# Patient Record
Sex: MALE | Race: WHITE | URBAN - METROPOLITAN AREA
[De-identification: names, ages, dates, MRNs, and addresses within clinical notes are randomized per-mention and may not be internally consistent; named-entity substitution may affect disease eponyms.]

---

## 2021-02-02 ENCOUNTER — APPOINTMENT (OUTPATIENT)
Dept: GENERAL RADIOLOGY | Age: 86
DRG: 521 | End: 2021-02-02
Attending: EMERGENCY MEDICINE
Payer: MEDICARE

## 2021-02-02 ENCOUNTER — APPOINTMENT (OUTPATIENT)
Dept: CT IMAGING | Age: 86
DRG: 521 | End: 2021-02-02
Attending: FAMILY MEDICINE
Payer: MEDICARE

## 2021-02-02 ENCOUNTER — HOSPITAL ENCOUNTER (INPATIENT)
Age: 86
LOS: 3 days | Discharge: NURSING, REHAB OR LONG-TERM CARE | DRG: 521 | End: 2021-02-05
Attending: EMERGENCY MEDICINE | Admitting: FAMILY MEDICINE
Payer: MEDICARE

## 2021-02-02 DIAGNOSIS — J18.9 PNEUMONIA DUE TO INFECTIOUS ORGANISM, UNSPECIFIED LATERALITY, UNSPECIFIED PART OF LUNG: ICD-10-CM

## 2021-02-02 DIAGNOSIS — S72.001A CLOSED FRACTURE OF RIGHT HIP, INITIAL ENCOUNTER (HCC): Primary | ICD-10-CM

## 2021-02-02 PROBLEM — S72.009A HIP FRACTURE (HCC): Status: ACTIVE | Noted: 2021-02-02

## 2021-02-02 LAB
ABO + RH BLD: NORMAL
ALBUMIN SERPL-MCNC: 1.8 G/DL (ref 3.5–5)
ALBUMIN/GLOB SERPL: 0.4 {RATIO} (ref 1.1–2.2)
ALP SERPL-CCNC: 100 U/L (ref 45–117)
ALT SERPL-CCNC: 25 U/L (ref 12–78)
ANION GAP SERPL CALC-SCNC: 3 MMOL/L (ref 5–15)
APTT PPP: 24.5 SEC (ref 22.1–31)
AST SERPL-CCNC: 53 U/L (ref 15–37)
ATRIAL RATE: 91 BPM
BASOPHILS # BLD: 0.1 K/UL (ref 0–0.1)
BASOPHILS NFR BLD: 0 % (ref 0–1)
BILIRUB SERPL-MCNC: 0.6 MG/DL (ref 0.2–1)
BLOOD GROUP ANTIBODIES SERPL: NORMAL
BUN SERPL-MCNC: 27 MG/DL (ref 6–20)
BUN/CREAT SERPL: 43 (ref 12–20)
CALCIUM SERPL-MCNC: 8.6 MG/DL (ref 8.5–10.1)
CALCULATED P AXIS, ECG09: 76 DEGREES
CALCULATED R AXIS, ECG10: 29 DEGREES
CALCULATED T AXIS, ECG11: 24 DEGREES
CHLORIDE SERPL-SCNC: 107 MMOL/L (ref 97–108)
CO2 SERPL-SCNC: 28 MMOL/L (ref 21–32)
COMMENT, HOLDF: NORMAL
CREAT SERPL-MCNC: 0.63 MG/DL (ref 0.7–1.3)
DIAGNOSIS, 93000: NORMAL
DIFFERENTIAL METHOD BLD: ABNORMAL
EOSINOPHIL # BLD: 0 K/UL (ref 0–0.4)
EOSINOPHIL NFR BLD: 0 % (ref 0–7)
ERYTHROCYTE [DISTWIDTH] IN BLOOD BY AUTOMATED COUNT: 16.7 % (ref 11.5–14.5)
GLOBULIN SER CALC-MCNC: 4.6 G/DL (ref 2–4)
GLUCOSE SERPL-MCNC: 90 MG/DL (ref 65–100)
HCT VFR BLD AUTO: 43.6 % (ref 36.6–50.3)
HGB BLD-MCNC: 14 G/DL (ref 12.1–17)
IMM GRANULOCYTES # BLD AUTO: 0.1 K/UL (ref 0–0.04)
IMM GRANULOCYTES NFR BLD AUTO: 0 % (ref 0–0.5)
INR PPP: 1 (ref 0.9–1.1)
LACTATE SERPL-SCNC: 1.3 MMOL/L (ref 0.4–2)
LYMPHOCYTES # BLD: 1.1 K/UL (ref 0.8–3.5)
LYMPHOCYTES NFR BLD: 8 % (ref 12–49)
MCH RBC QN AUTO: 27.2 PG (ref 26–34)
MCHC RBC AUTO-ENTMCNC: 32.1 G/DL (ref 30–36.5)
MCV RBC AUTO: 84.8 FL (ref 80–99)
MONOCYTES # BLD: 1.6 K/UL (ref 0–1)
MONOCYTES NFR BLD: 11 % (ref 5–13)
NEUTS SEG # BLD: 11.4 K/UL (ref 1.8–8)
NEUTS SEG NFR BLD: 81 % (ref 32–75)
NRBC # BLD: 0 K/UL (ref 0–0.01)
NRBC BLD-RTO: 0 PER 100 WBC
P-R INTERVAL, ECG05: 216 MS
PLATELET # BLD AUTO: 262 K/UL (ref 150–400)
PMV BLD AUTO: 10.1 FL (ref 8.9–12.9)
POTASSIUM SERPL-SCNC: 4.2 MMOL/L (ref 3.5–5.1)
PROT SERPL-MCNC: 6.4 G/DL (ref 6.4–8.2)
PROTHROMBIN TIME: 10.9 SEC (ref 9–11.1)
Q-T INTERVAL, ECG07: 380 MS
QRS DURATION, ECG06: 98 MS
QTC CALCULATION (BEZET), ECG08: 467 MS
RBC # BLD AUTO: 5.14 M/UL (ref 4.1–5.7)
SAMPLES BEING HELD,HOLD: NORMAL
SARS-COV-2, COV2: NORMAL
SODIUM SERPL-SCNC: 138 MMOL/L (ref 136–145)
SPECIMEN EXP DATE BLD: NORMAL
THERAPEUTIC RANGE,PTTT: NORMAL SECS (ref 58–77)
VENTRICULAR RATE, ECG03: 91 BPM
WBC # BLD AUTO: 14.2 K/UL (ref 4.1–11.1)

## 2021-02-02 PROCEDURE — 93005 ELECTROCARDIOGRAM TRACING: CPT

## 2021-02-02 PROCEDURE — 36415 COLL VENOUS BLD VENIPUNCTURE: CPT

## 2021-02-02 PROCEDURE — 71250 CT THORAX DX C-: CPT

## 2021-02-02 PROCEDURE — 87040 BLOOD CULTURE FOR BACTERIA: CPT

## 2021-02-02 PROCEDURE — 74011250636 HC RX REV CODE- 250/636: Performed by: EMERGENCY MEDICINE

## 2021-02-02 PROCEDURE — 85610 PROTHROMBIN TIME: CPT

## 2021-02-02 PROCEDURE — 73502 X-RAY EXAM HIP UNI 2-3 VIEWS: CPT

## 2021-02-02 PROCEDURE — 70450 CT HEAD/BRAIN W/O DYE: CPT

## 2021-02-02 PROCEDURE — 71045 X-RAY EXAM CHEST 1 VIEW: CPT

## 2021-02-02 PROCEDURE — 80053 COMPREHEN METABOLIC PANEL: CPT

## 2021-02-02 PROCEDURE — U0005 INFEC AGEN DETEC AMPLI PROBE: HCPCS

## 2021-02-02 PROCEDURE — 83605 ASSAY OF LACTIC ACID: CPT

## 2021-02-02 PROCEDURE — 65270000029 HC RM PRIVATE

## 2021-02-02 PROCEDURE — 74011000258 HC RX REV CODE- 258: Performed by: EMERGENCY MEDICINE

## 2021-02-02 PROCEDURE — 86901 BLOOD TYPING SEROLOGIC RH(D): CPT

## 2021-02-02 PROCEDURE — 74011250636 HC RX REV CODE- 250/636: Performed by: NURSE PRACTITIONER

## 2021-02-02 PROCEDURE — 85025 COMPLETE CBC W/AUTO DIFF WBC: CPT

## 2021-02-02 PROCEDURE — 85730 THROMBOPLASTIN TIME PARTIAL: CPT

## 2021-02-02 PROCEDURE — 99285 EMERGENCY DEPT VISIT HI MDM: CPT

## 2021-02-02 RX ORDER — MORPHINE SULFATE 2 MG/ML
1 INJECTION, SOLUTION INTRAMUSCULAR; INTRAVENOUS
Status: DISCONTINUED | OUTPATIENT
Start: 2021-02-02 | End: 2021-02-05 | Stop reason: HOSPADM

## 2021-02-02 RX ADMIN — SODIUM CHLORIDE 1000 ML: 9 INJECTION, SOLUTION INTRAVENOUS at 17:55

## 2021-02-02 RX ADMIN — MORPHINE SULFATE 1 MG: 2 INJECTION, SOLUTION INTRAMUSCULAR; INTRAVENOUS at 20:59

## 2021-02-02 RX ADMIN — CEFTRIAXONE SODIUM 1 G: 1 INJECTION, POWDER, FOR SOLUTION INTRAMUSCULAR; INTRAVENOUS at 17:56

## 2021-02-02 NOTE — PROGRESS NOTES
TRANSFER - IN REPORT:    Verbal report received from [facility] RN(name) on Santos Yeager  being received from ED(unit) for routine progression of care      Report consisted of patients Situation, Background, Assessment and   Recommendations(SBAR). Information from the following report(s) SBAR, ED Summary, OR Summary, MAR, Recent Results and Med Rec Status was reviewed with the receiving nurse. Opportunity for questions and clarification was provided. Assessment completed upon patients arrival to unit and care assumed.

## 2021-02-02 NOTE — ED NOTES
Pt arrives via EMS from [facility] for RIGHT femoral neck fx.  Per EMS, staff reports pt was complaining of hip pain and not wanting to get out of bed.  No known fall or injury per staff.

## 2021-02-02 NOTE — ED PROVIDER NOTES
This is a 51-year-old male with no family here. He was sent from nursing home after he was complaining of some pain in the right hip. There is no clear history of any fall or injury that is currently known. Patient is generally responsive to some verbal stimulus. There is no overt evidence of injury other than the complaint of leg pain. Patient is unable to provide any history at this time. I am currently unable to find any nursing home information. Attempt to get further information from the home. No past medical history on file. No past surgical history on file. No family history on file.     Social History     Socioeconomic History    Marital status:      Spouse name: Not on file    Number of children: Not on file    Years of education: Not on file    Highest education level: Not on file   Occupational History    Not on file   Social Needs    Financial resource strain: Not on file    Food insecurity     Worry: Not on file     Inability: Not on file    Transportation needs     Medical: Not on file     Non-medical: Not on file   Tobacco Use    Smoking status: Not on file   Substance and Sexual Activity    Alcohol use: Not on file    Drug use: Not on file    Sexual activity: Not on file   Lifestyle    Physical activity     Days per week: Not on file     Minutes per session: Not on file    Stress: Not on file   Relationships    Social connections     Talks on phone: Not on file     Gets together: Not on file     Attends [employer] service: Not on file     Active member of club or organization: Not on file     Attends meetings of clubs or organizations: Not on file     Relationship status: Not on file    Intimate partner violence     Fear of current or ex partner: Not on file     Emotionally abused: Not on file     Physically abused: Not on file     Forced sexual activity: Not on file   Other Topics Concern    Not on file   Social History Narrative    Not on file ALLERGIES: Patient has no known allergies. Review of Systems   Reason unable to perform ROS: The patient is unable to provide any history. The squad noted that the patient only was complaining of some pain in the right hip. Vitals:    02/02/21 1442   BP: (!) 159/88   Pulse: 90   Resp: 16   Temp: 98.3 °F (36.8 °C)   SpO2: 96%            Physical Exam  Vitals signs and nursing note reviewed. Constitutional:       General: He is not in acute distress. Appearance: He is well-developed. He is not ill-appearing, toxic-appearing or diaphoretic. Comments: Is nonverbal and resting comfortably. Vital signs are as noted. HENT:      Head: Normocephalic and atraumatic. Nose: Nose normal.   Eyes:      General: No scleral icterus. Conjunctiva/sclera: Conjunctivae normal.      Pupils: Pupils are equal, round, and reactive to light. Neck:      Musculoskeletal: Normal range of motion and neck supple. Thyroid: No thyromegaly. Vascular: No JVD. Trachea: No tracheal deviation. Comments: No carotid bruits noted. Cardiovascular:      Rate and Rhythm: Normal rate and regular rhythm. Heart sounds: Normal heart sounds. No murmur. No friction rub. No gallop. Pulmonary:      Effort: Pulmonary effort is normal. No respiratory distress. Breath sounds: Normal breath sounds. No wheezing or rales. Chest:      Chest wall: No tenderness. Abdominal:      General: Bowel sounds are normal. There is no distension. Palpations: Abdomen is soft. There is no mass. Tenderness: There is no abdominal tenderness. There is no guarding or rebound. Musculoskeletal: Normal range of motion. General: Swelling, tenderness and deformity present. Comments: Some external rotation and slight shortening of the right lower extremity. Lymphadenopathy:      Cervical: No cervical adenopathy. Skin:     General: Skin is warm and dry. Coloration: Skin is pale. Findings: No erythema or rash. Neurological:      Mental Status: He is alert. Cranial Nerves: No cranial nerve deficit. Coordination: Coordination normal.      Deep Tendon Reflexes: Reflexes are normal and symmetric. Comments: Patient is not verbal at this time. Psychiatric:      Comments: Unable to determine due to mental status          MDM  Number of Diagnoses or Management Options  Closed fracture of right hip, initial encounter [facility]): new and requires workup  Pneumonia due to infectious organism, unspecified laterality, unspecified part of lung: new and requires workup     Amount and/or Complexity of Data Reviewed  Clinical lab tests: ordered and reviewed  Tests in the radiology section of CPT®: ordered and reviewed  Decide to obtain previous medical records or to obtain history from someone other than the patient: yes  Review and summarize past medical records: yes  Discuss the patient with other providers: yes  Independent visualization of images, tracings, or specimens: yes    Risk of Complications, Morbidity, and/or Mortality  Presenting problems: high  Diagnostic procedures: high  Management options: high    Patient Progress  Patient progress: stable         Procedures  ED MD EKG interpretation: There appears to be a normal sinus rhythm at 91 beats a minute. Axis is normal.  No ectopy is appreciated. There is nonspecific ST and T change without acute abnormality. There is a first-degree AV block. Donna Schmitt MD    The x-ray demonstrates a femoral neck fracture. There is also question pneumonia on chest x-ray. Patient will be admitted to the hospitalist service and treated for both. Consult has been placed with orthopedics. Perfect Serve Consult for Admission  4:31 PM    ED Room Number: F/HF  Patient Name and age:  [place] Spruce 80 y.o.  male  Working Diagnosis:   1. Closed fracture of right hip, initial encounter ([facility] [address]    2.  Pneumonia due to infectious organism, unspecified laterality, unspecified part of lung        COVID-19 Suspicion:  yes  Sepsis present:  no  Reassessment needed: yes  Code Status:  Full Code  Readmission: no  Isolation Requirements:  yes  Recommended Level of Care:  telemetry  Department:[facility] Adult ED - 21   Other:  Ortho notified

## 2021-02-02 NOTE — ROUTINE PROCESS
TRANSFER - OUT REPORT: 
 
Verbal report given to Jessica RN (name) on Montse Taylor  being transferred to [facility] (unit) for routine progression of care Report consisted of patients Situation, Background, Assessment and  
Recommendations(SBAR). Information from the following report(s) SBAR, ED Summary, [facility] and Recent Results was reviewed with the receiving nurse. Lines:  
Peripheral IV 02/02/21 Right Hand (Active) Site Assessment Clean, dry, & intact 02/02/21 1753 Phlebitis Assessment 0 02/02/21 1753 Infiltration Assessment 0 02/02/21 1753 Dressing Status Clean, dry, & intact 02/02/21 1753 Dressing Type Transparent 02/02/21 1753 Hub Color/Line Status Blue;Flushed 02/02/21 1753 Action Taken Blood drawn 02/02/21 1753 Opportunity for questions and clarification was provided. Patient transported with: 
 [facility] 2L [place]

## 2021-02-02 NOTE — ED NOTES
Verbal shift change report given to German RANGEL RN (oncoming nurse) by Saima Felton (offgoing nurse). Report included the following information SBAR and ED Summary.

## 2021-02-02 NOTE — CONSULTS
Geriatric Fracture Consult  Patient: Fred Schmitz  YOB: 1921   MRN: [id number]      Consult Date: 2/2/2021     Chief Complaint: Right femoral neck fracture  ED Presentation Time: < 8 hours  Mechanism of Injury: unknown, no reported fall, patient found in bed at nursing home  Ambulatory Status: Chair Bound  Past Medical History: No past medical history on file. Allergies: No Known Allergies   Past Surgical History: No past surgical history on file.    Social History:   Social History     Socioeconomic History    Marital status:      Spouse name: Not on file    Number of children: Not on file    Years of education: Not on file    Highest education level: Not on file   Occupational History    Not on file   Social Needs    Financial resource strain: Not on file    Food insecurity     Worry: Not on file     Inability: Not on file    Transportation needs     Medical: Not on file     Non-medical: Not on file   Tobacco Use    Smoking status: Not on file   Substance and Sexual Activity    Alcohol use: Not on file    Drug use: Not on file    Sexual activity: Not on file   Lifestyle    Physical activity     Days per week: Not on file     Minutes per session: Not on file    Stress: Not on file   Relationships    Social connections     Talks on phone: Not on file     Gets together: Not on file     Attends [employer] service: Not on file     Active member of club or organization: Not on file     Attends meetings of clubs or organizations: Not on file     Relationship status: Not on file    Intimate partner violence     Fear of current or ex partner: Not on file     Emotionally abused: Not on file     Physically abused: Not on file     Forced sexual activity: Not on file   Other Topics Concern    Not on file   Social History Narrative    Not on file      Dwelling Status: Skilled Nursing Facility  Current Anticoagulant Medications: none  History of falls: YES  Prior Fractures: none    X-RAYS:   XR HIP RT W OR WO PELV 2-3 VWS 2/2/2021 15:20  INDICATION: trauma. COMPARISON: None. FINDINGS:  AP view of the pelvis and a frogleg lateral view of the right hip  demonstrate an acute basicervical right femur neck fracture with foreshortening  and varus angulation. No other fractures identified and bones are diffusely  osteopenic. Degenerative spine changes are noted in the visualized lower lumbar  spine. IMPRESSION  Acute foreshortened and varus angulated basicervical right femur    Labs:    Lab Results   Component Value Date/Time    HGB 14.0 02/02/2021 03:38 PM    WBC 14.2 (H) 02/02/2021 03:38 PM    INR 1.0 02/02/2021 03:38 PM    Albumin 1.8 (L) 02/02/2021 03:38 PM        Physical Exam:   General: 79yo male, no distress, appears stated age, does not respond to questions  CNS: presume he is confused, disoriented   Vascular: pedal pulses normal and no edema   Skin: bruising right hip  Extremities: right leg shortened and externally rotated, pain with passive right hip ROM  Neurologic: spontaneous movement of right foot    Assessment: Right femoral neck fracture    Plan: Will need surgical repair. Patient not able to respond to discuss. I spoke with his son, Clark Dinh (lives in [place]), at length. He is understandably concerned about surgery. Clark Dinh asked me to try to communicate with patient to determine his wishes. Patient does not have his hearing aids thus unable to hear. At Drake's request I wrote out my questions and asked patient to read and respond. He does not have his glasses either. Clark Dinh will ask his brother who lives locally to bring patient's hearing aids and glasses from nursing home. I will meet with patient again when he has these items tomorrow. In the meantime, Clark Dinh reluctantly consents to proceeding with surgery. Will make patient NPO after midnight. Plan for right hip hemiarthroplasty 2/3/21.          Signed by: TUNDE Dai   Today's Date: February 2, 2021

## 2021-02-02 NOTE — H&P
History & Physical    Primary Care Provider: UNKNOWN  Source of Information: Patient's son    History of Presenting Illness:   Cory Bee is a 80 y.o. male who presents with fall    Patient is a poor historian, not much history could be obtained from the patient, patient is extremely hard of hearing, does answer some questions yes and no. History was primarily obtained from patient's son Sadiq Cottrell    Patient son reported that patient lives at bedside along assisted living facility. 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 Patient is extremely hard of hearing and without his hearing aid cannot hear much. He reports that he has not been able to see his dad since December 2020 and even at that time he appeared to be somewhat confused and he suspects that the patient is starting to have some dementia. He reports that patient is extremely stubborn and still gets up and does his chores and has had falls in the past.  Patient apparently came to the ER complaining of right hip pain, when I went to evaluate the patient he was not able to provide much history but does appear to be in pain and discomfort and is pointing to his right leg. Patient son reports that patient has had weakness on the right side due to the stroke and also reports that he is a DNR. No further history could be obtained from the patient. Patient was found to have a right femoral fracture and was requested to be admitted to the hospitalist service. No further history can be obtained from the patient or patient's son       Review of Systems:  Review of systems not obtained due to patient factors. Confusion and extremely hard of    No past medical history on file. No past surgical history on file. Prior to Admission medications    Not on File     No Known Allergies   No family history on file.      SOCIAL HISTORY:  Patient resides:  Independently    Assisted Living x   SNF    With family care       Smoking history:   xNone x   Former    Chronic      Alcohol history:   None    Social x   Chronic      Ambulates:   Independently x   w/cane    w/walker    w/wc    CODE STATUS:  DNR x   Full    Other      Objective:     Physical Exam:     Visit Vitals  BP (!) 175/91 (BP 1 Location: Right upper arm, BP Patient Position: Supine)   Pulse 91   Temp 98.1 °F (36.7 °C)   Resp 20   SpO2 94%    O2 Flow Rate (L/min): 2 l/min O2 Device: Nasal cannula    General : alert x 1, slightly confused, extremely hard of hearing  HEENT: PEERL, moist mucus membrane, TM clear  Neck: supple, no JVD, no meningeal signs  Chest: Coarse rhonchi bilateral lung bases left wrist and right  CVS: S1 S2 heard,   Abd: soft/ Non tender, non distended, BS physiological,   Ext: no clubbing, no cyanosis, no edema,  Neuro/Psych: Limited exam, patient not participating in the exam, and not following instructions, DTR 1+x4, noted purposeful movement of the bilateral upper extremity with strength could not be tested,  Skin: warm      EKG: Rhythm with nonspecific ST changes      Data Review:     Recent Days:  Recent Labs     02/02/21  1538   WBC 14.2*   HGB 14.0   HCT 43.6        Recent Labs     02/02/21  1538      K 4.2      CO2 28   GLU 90   BUN 27*   CREA 0.63*   CA 8.6   ALB 1.8*   ALT 25   INR 1.0     No results for input(s): PH, PCO2, PO2, HCO3, FIO2 in the last 72 hours.     24 Hour Results:  Recent Results (from the past 24 hour(s))   EKG, 12 LEAD, INITIAL    Collection Time: 02/02/21  3:25 PM   Result Value Ref Range    Ventricular Rate 91 BPM    Atrial Rate 91 BPM    P-R Interval 216 ms    QRS Duration 98 ms    Q-T Interval 380 ms    QTC Calculation (Bezet) 467 ms    Calculated P Axis 76 degrees    Calculated R Axis 29 degrees    Calculated T Axis 24 degrees    Diagnosis       Sinus rhythm with 1st degree AV block  Low voltage QRS  Nonspecific ST abnormality  No previous ECGs available CBC WITH AUTOMATED DIFF    Collection Time: 02/02/21  3:38 PM   Result Value Ref Range    WBC 14.2 (H) 4.1 - 11.1 K/uL    RBC 5.14 4.10 - 5.70 M/uL    HGB 14.0 12.1 - 17.0 g/dL    HCT 43.6 36.6 - 50.3 %    MCV 84.8 80.0 - 99.0 FL    MCH 27.2 26.0 - 34.0 PG    MCHC 32.1 30.0 - 36.5 g/dL    RDW 16.7 (H) 11.5 - 14.5 %    PLATELET 683 304 - 318 K/uL    MPV 10.1 8.9 - 12.9 FL    NRBC 0.0 0  WBC    ABSOLUTE NRBC 0.00 0.00 - 0.01 K/uL    NEUTROPHILS 81 (H) 32 - 75 %    LYMPHOCYTES 8 (L) 12 - 49 %    MONOCYTES 11 5 - 13 %    EOSINOPHILS 0 0 - 7 %    BASOPHILS 0 0 - 1 %    IMMATURE GRANULOCYTES 0 0.0 - 0.5 %    ABS. NEUTROPHILS 11.4 (H) 1.8 - 8.0 K/UL    ABS. LYMPHOCYTES 1.1 0.8 - 3.5 K/UL    ABS. MONOCYTES 1.6 (H) 0.0 - 1.0 K/UL    ABS. EOSINOPHILS 0.0 0.0 - 0.4 K/UL    ABS. BASOPHILS 0.1 0.0 - 0.1 K/UL    ABS. IMM. GRANS. 0.1 (H) 0.00 - 0.04 K/UL    DF AUTOMATED     METABOLIC PANEL, COMPREHENSIVE    Collection Time: 02/02/21  3:38 PM   Result Value Ref Range    Sodium 138 136 - 145 mmol/L    Potassium 4.2 3.5 - 5.1 mmol/L    Chloride 107 97 - 108 mmol/L    CO2 28 21 - 32 mmol/L    Anion gap 3 (L) 5 - 15 mmol/L    Glucose 90 65 - 100 mg/dL    BUN 27 (H) 6 - 20 MG/DL    Creatinine 0.63 (L) 0.70 - 1.30 MG/DL    BUN/Creatinine ratio 43 (H) 12 - 20      GFR est AA >60 >60 ml/min/1.73m2    GFR est non-AA >60 >60 ml/min/1.73m2    Calcium 8.6 8.5 - 10.1 MG/DL    Bilirubin, total 0.6 0.2 - 1.0 MG/DL    ALT (SGPT) 25 12 - 78 U/L    AST (SGOT) 53 (H) 15 - 37 U/L    Alk.  phosphatase 100 45 - 117 U/L    Protein, total 6.4 6.4 - 8.2 g/dL    Albumin 1.8 (L) 3.5 - 5.0 g/dL    Globulin 4.6 (H) 2.0 - 4.0 g/dL    A-G Ratio 0.4 (L) 1.1 - 2.2     PTT    Collection Time: 02/02/21  3:38 PM   Result Value Ref Range    aPTT 24.5 22.1 - 31.0 sec    aPTT, therapeutic range     58.0 - 77.0 SECS   PROTHROMBIN TIME + INR    Collection Time: 02/02/21  3:38 PM   Result Value Ref Range    INR 1.0 0.9 - 1.1      Prothrombin time 10.9 9.0 - 11.1 sec   TYPE & SCREEN    Collection Time: 02/02/21  3:38 PM   Result Value Ref Range    Crossmatch Expiration 02/05/2021,2359     ABO/Rh(D) A POSITIVE     Antibody screen NEG    SAMPLES BEING HELD    Collection Time: 02/02/21  3:38 PM   Result Value Ref Range    SAMPLES BEING HELD 1 RED     COMMENT        Add-on orders for these samples will be processed based on acceptable specimen integrity and analyte stability, which may vary by analyte. Imaging:   Xr Chest Sngl V    Result Date: 2/2/2021  Left lower lobe airspace infiltrate concerning for pneumonia or pulmonary contusion versus chronic change as clinically appropriate. Follow-up chest x-ray or CT evaluation recommended. Xr Hip Rt W Or Wo Pelv 2-3 Vws    Result Date: 2/2/2021  Acute foreshortened and varus angulated basicervical right femur neck fracture.     Assessment:     Right femoral fracture: Patient will be admitted on a orthopedic bed, IV hydration, bedrest, pain control, Ortho consult, n.p.o. for now, supportive care and close monitoring, spoke with son who is not very keen to have the patient undergo surgical intervention, continue to monitor, reassess as needed    Left lower lobe pneumonia: Start patient on broad-spectrum IV antibiotics, blood cultures, oxygen support, pulse ox monitoring, Covid testing pending, aspiration precautions, supportive care and close monitoring, reassess as needed, patient n.p.o. for now, monitor    Dehydration: Patient appears to be severely dehydrated, start patient on IV hydration, repeat labs in the morning, continue monitor    Metabolic encephalopathy: Concern for underlying dementia and age-related chronic changes, will check a UA, provide hydration, CT of the head to rule out acute pathology, neurovascular checks, if symptoms persist may consider further intervention and diagnostics, continue to monitor, reassess as needed    GI DVT prophylaxis: Patient will be on heparin           Signed By: Filomena Nugent, MD     February 2, 2021

## 2021-02-03 ENCOUNTER — ANESTHESIA EVENT (OUTPATIENT)
Dept: SURGERY | Age: 86
DRG: 521 | End: 2021-02-03
Payer: MEDICARE

## 2021-02-03 ENCOUNTER — APPOINTMENT (OUTPATIENT)
Dept: NON INVASIVE DIAGNOSTICS | Age: 86
DRG: 521 | End: 2021-02-03
Attending: FAMILY MEDICINE
Payer: MEDICARE

## 2021-02-03 ENCOUNTER — APPOINTMENT (OUTPATIENT)
Dept: GENERAL RADIOLOGY | Age: 86
DRG: 521 | End: 2021-02-03
Attending: ORTHOPAEDIC SURGERY
Payer: MEDICARE

## 2021-02-03 ENCOUNTER — ANESTHESIA (OUTPATIENT)
Dept: SURGERY | Age: 86
DRG: 521 | End: 2021-02-03
Payer: MEDICARE

## 2021-02-03 LAB
ALBUMIN SERPL-MCNC: 1.7 G/DL (ref 3.5–5)
ALBUMIN/GLOB SERPL: 0.4 {RATIO} (ref 1.1–2.2)
ALP SERPL-CCNC: 103 U/L (ref 45–117)
ALT SERPL-CCNC: 23 U/L (ref 12–78)
ANION GAP SERPL CALC-SCNC: 6 MMOL/L (ref 5–15)
AST SERPL-CCNC: 45 U/L (ref 15–37)
BASOPHILS # BLD: 0 K/UL (ref 0–0.1)
BASOPHILS NFR BLD: 0 % (ref 0–1)
BILIRUB SERPL-MCNC: 0.5 MG/DL (ref 0.2–1)
BUN SERPL-MCNC: 23 MG/DL (ref 6–20)
BUN/CREAT SERPL: 40 (ref 12–20)
CALCIUM SERPL-MCNC: 8.5 MG/DL (ref 8.5–10.1)
CHLORIDE SERPL-SCNC: 109 MMOL/L (ref 97–108)
CO2 SERPL-SCNC: 26 MMOL/L (ref 21–32)
CREAT SERPL-MCNC: 0.58 MG/DL (ref 0.7–1.3)
DIFFERENTIAL METHOD BLD: ABNORMAL
EOSINOPHIL # BLD: 0 K/UL (ref 0–0.4)
EOSINOPHIL NFR BLD: 0 % (ref 0–7)
ERYTHROCYTE [DISTWIDTH] IN BLOOD BY AUTOMATED COUNT: 16.6 % (ref 11.5–14.5)
GLOBULIN SER CALC-MCNC: 4.7 G/DL (ref 2–4)
GLUCOSE BLD STRIP.AUTO-MCNC: 113 MG/DL (ref 65–100)
GLUCOSE BLD STRIP.AUTO-MCNC: 169 MG/DL (ref 65–100)
GLUCOSE BLD STRIP.AUTO-MCNC: 59 MG/DL (ref 65–100)
GLUCOSE SERPL-MCNC: 68 MG/DL (ref 65–100)
HCT VFR BLD AUTO: 44.8 % (ref 36.6–50.3)
HGB BLD-MCNC: 13.8 G/DL (ref 12.1–17)
IMM GRANULOCYTES # BLD AUTO: 0 K/UL (ref 0–0.04)
IMM GRANULOCYTES NFR BLD AUTO: 0 % (ref 0–0.5)
LYMPHOCYTES # BLD: 1.3 K/UL (ref 0.8–3.5)
LYMPHOCYTES NFR BLD: 6 % (ref 12–49)
MCH RBC QN AUTO: 27.2 PG (ref 26–34)
MCHC RBC AUTO-ENTMCNC: 30.8 G/DL (ref 30–36.5)
MCV RBC AUTO: 88.4 FL (ref 80–99)
MONOCYTES # BLD: 2.5 K/UL (ref 0–1)
MONOCYTES NFR BLD: 12 % (ref 5–13)
NEUTS SEG # BLD: 17.3 K/UL (ref 1.8–8)
NEUTS SEG NFR BLD: 82 % (ref 32–75)
NRBC # BLD: 0 K/UL (ref 0–0.01)
NRBC BLD-RTO: 0 PER 100 WBC
PLATELET # BLD AUTO: 256 K/UL (ref 150–400)
PLATELET COMMENTS,PCOM: ABNORMAL
PMV BLD AUTO: 9.8 FL (ref 8.9–12.9)
POTASSIUM SERPL-SCNC: 4.1 MMOL/L (ref 3.5–5.1)
PROT SERPL-MCNC: 6.4 G/DL (ref 6.4–8.2)
RBC # BLD AUTO: 5.07 M/UL (ref 4.1–5.7)
RBC MORPH BLD: ABNORMAL
SARS-COV-2, XPLCVT: NOT DETECTED
SERVICE CMNT-IMP: ABNORMAL
SODIUM SERPL-SCNC: 141 MMOL/L (ref 136–145)
SOURCE, COVRS: NORMAL
WBC # BLD AUTO: 21.1 K/UL (ref 4.1–11.1)

## 2021-02-03 PROCEDURE — 77030040361 HC SLV COMPR DVT MDII -B: Performed by: ORTHOPAEDIC SURGERY

## 2021-02-03 PROCEDURE — 2709999900 HC NON-CHARGEABLE SUPPLY: Performed by: ORTHOPAEDIC SURGERY

## 2021-02-03 PROCEDURE — 77030006835 HC BLD SAW SAG STRY -B: Performed by: ORTHOPAEDIC SURGERY

## 2021-02-03 PROCEDURE — 82962 GLUCOSE BLOOD TEST: CPT

## 2021-02-03 PROCEDURE — 77030026438 HC STYL ET INTUB CARD -A: Performed by: ANESTHESIOLOGY

## 2021-02-03 PROCEDURE — 80053 COMPREHEN METABOLIC PANEL: CPT

## 2021-02-03 PROCEDURE — 74011250636 HC RX REV CODE- 250/636: Performed by: ORTHOPAEDIC SURGERY

## 2021-02-03 PROCEDURE — 77030031139 HC SUT VCRL2 J&J -A: Performed by: ORTHOPAEDIC SURGERY

## 2021-02-03 PROCEDURE — 77030018771 HC KT PREP BN CEM ZIMM -C: Performed by: ORTHOPAEDIC SURGERY

## 2021-02-03 PROCEDURE — 77030018673: Performed by: ORTHOPAEDIC SURGERY

## 2021-02-03 PROCEDURE — 94760 N-INVAS EAR/PLS OXIMETRY 1: CPT

## 2021-02-03 PROCEDURE — 74011250636 HC RX REV CODE- 250/636: Performed by: NURSE ANESTHETIST, CERTIFIED REGISTERED

## 2021-02-03 PROCEDURE — C1776 JOINT DEVICE (IMPLANTABLE): HCPCS | Performed by: ORTHOPAEDIC SURGERY

## 2021-02-03 PROCEDURE — 74011000258 HC RX REV CODE- 258: Performed by: FAMILY MEDICINE

## 2021-02-03 PROCEDURE — 74011000250 HC RX REV CODE- 250: Performed by: ANESTHESIOLOGY

## 2021-02-03 PROCEDURE — 74011250636 HC RX REV CODE- 250/636: Performed by: FAMILY MEDICINE

## 2021-02-03 PROCEDURE — 77030035023 HC PREP FEM CEMEX KT EXAC -B: Performed by: ORTHOPAEDIC SURGERY

## 2021-02-03 PROCEDURE — 76210000017 HC OR PH I REC 1.5 TO 2 HR: Performed by: ORTHOPAEDIC SURGERY

## 2021-02-03 PROCEDURE — 76060000034 HC ANESTHESIA 1.5 TO 2 HR: Performed by: ORTHOPAEDIC SURGERY

## 2021-02-03 PROCEDURE — 74011000250 HC RX REV CODE- 250: Performed by: PHYSICIAN ASSISTANT

## 2021-02-03 PROCEDURE — 77030008684 HC TU ET CUF COVD -B: Performed by: ANESTHESIOLOGY

## 2021-02-03 PROCEDURE — C1713 ANCHOR/SCREW BN/BN,TIS/BN: HCPCS | Performed by: ORTHOPAEDIC SURGERY

## 2021-02-03 PROCEDURE — 76010000149 HC OR TIME 1 TO 1.5 HR: Performed by: ORTHOPAEDIC SURGERY

## 2021-02-03 PROCEDURE — 77030010785: Performed by: ORTHOPAEDIC SURGERY

## 2021-02-03 PROCEDURE — 77030040241 HC ABD PLLW HIP MDII -B: Performed by: ORTHOPAEDIC SURGERY

## 2021-02-03 PROCEDURE — 77030018547 HC SUT ETHBND1 J&J -B: Performed by: ORTHOPAEDIC SURGERY

## 2021-02-03 PROCEDURE — 74011250636 HC RX REV CODE- 250/636: Performed by: PHYSICIAN ASSISTANT

## 2021-02-03 PROCEDURE — 74011000258 HC RX REV CODE- 258: Performed by: ORTHOPAEDIC SURGERY

## 2021-02-03 PROCEDURE — 85025 COMPLETE CBC W/AUTO DIFF WBC: CPT

## 2021-02-03 PROCEDURE — P9045 ALBUMIN (HUMAN), 5%, 250 ML: HCPCS | Performed by: NURSE ANESTHETIST, CERTIFIED REGISTERED

## 2021-02-03 PROCEDURE — 36415 COLL VENOUS BLD VENIPUNCTURE: CPT

## 2021-02-03 PROCEDURE — 73501 X-RAY EXAM HIP UNI 1 VIEW: CPT

## 2021-02-03 PROCEDURE — 74011000250 HC RX REV CODE- 250: Performed by: NURSE ANESTHETIST, CERTIFIED REGISTERED

## 2021-02-03 PROCEDURE — 77030008462 HC STPLR SKN PROX J&J -A: Performed by: ORTHOPAEDIC SURGERY

## 2021-02-03 PROCEDURE — 77030035236 HC SUT PDS STRATFX BARB J&J -B: Performed by: ORTHOPAEDIC SURGERY

## 2021-02-03 PROCEDURE — 77010033678 HC OXYGEN DAILY

## 2021-02-03 PROCEDURE — 77030040830 HC CATH URETH FOL MDII -A: Performed by: ORTHOPAEDIC SURGERY

## 2021-02-03 PROCEDURE — 0SRR0J9 REPLACEMENT OF RIGHT HIP JOINT, FEMORAL SURFACE WITH SYNTHETIC SUBSTITUTE, CEMENTED, OPEN APPROACH: ICD-10-PCS | Performed by: ORTHOPAEDIC SURGERY

## 2021-02-03 PROCEDURE — 65270000029 HC RM PRIVATE

## 2021-02-03 PROCEDURE — 74011250636 HC RX REV CODE- 250/636: Performed by: NURSE PRACTITIONER

## 2021-02-03 PROCEDURE — 77030008075: Performed by: ORTHOPAEDIC SURGERY

## 2021-02-03 DEVICE — IMPLANTABLE DEVICE
Type: IMPLANTABLE DEVICE | Site: HIP | Status: FUNCTIONAL
Brand: NOVATION

## 2021-02-03 DEVICE — IMPLANTABLE DEVICE
Type: IMPLANTABLE DEVICE | Site: HIP | Status: FUNCTIONAL
Brand: EXACTECH

## 2021-02-03 DEVICE — IMPLANTABLE DEVICE
Type: IMPLANTABLE DEVICE | Site: HIP | Status: FUNCTIONAL
Brand: ACUMATCH L-SERIES

## 2021-02-03 DEVICE — SMARTSET GHV GENTAMICIN HIGH VISCOSITY BONE CEMENT 40G
Type: IMPLANTABLE DEVICE | Site: HIP | Status: FUNCTIONAL
Brand: SMARTSET

## 2021-02-03 DEVICE — IMPL CAPPED H4 HEMI UNI/BIPOLAR EXACTECH: Type: IMPLANTABLE DEVICE | Status: FUNCTIONAL

## 2021-02-03 RX ORDER — SODIUM CHLORIDE 0.9 % (FLUSH) 0.9 %
5-40 SYRINGE (ML) INJECTION AS NEEDED
Status: DISCONTINUED | OUTPATIENT
Start: 2021-02-03 | End: 2021-02-05 | Stop reason: HOSPADM

## 2021-02-03 RX ORDER — ATORVASTATIN CALCIUM 20 MG/1
20 TABLET, FILM COATED ORAL DAILY
COMMUNITY

## 2021-02-03 RX ORDER — SODIUM CHLORIDE 0.9 % (FLUSH) 0.9 %
5-40 SYRINGE (ML) INJECTION AS NEEDED
Status: DISCONTINUED | OUTPATIENT
Start: 2021-02-03 | End: 2021-02-03 | Stop reason: HOSPADM

## 2021-02-03 RX ORDER — SODIUM CHLORIDE 0.9 % (FLUSH) 0.9 %
5-40 SYRINGE (ML) INJECTION EVERY 8 HOURS
Status: DISCONTINUED | OUTPATIENT
Start: 2021-02-03 | End: 2021-02-03 | Stop reason: HOSPADM

## 2021-02-03 RX ORDER — AMOXICILLIN 250 MG
1 CAPSULE ORAL 2 TIMES DAILY
Status: DISCONTINUED | OUTPATIENT
Start: 2021-02-04 | End: 2021-02-05 | Stop reason: HOSPADM

## 2021-02-03 RX ORDER — MIDAZOLAM HYDROCHLORIDE 1 MG/ML
1 INJECTION, SOLUTION INTRAMUSCULAR; INTRAVENOUS AS NEEDED
Status: DISCONTINUED | OUTPATIENT
Start: 2021-02-03 | End: 2021-02-03 | Stop reason: HOSPADM

## 2021-02-03 RX ORDER — MORPHINE SULFATE 2 MG/ML
1 INJECTION, SOLUTION INTRAMUSCULAR; INTRAVENOUS
Status: ACTIVE | OUTPATIENT
Start: 2021-02-03 | End: 2021-02-04

## 2021-02-03 RX ORDER — FENTANYL CITRATE 50 UG/ML
25 INJECTION, SOLUTION INTRAMUSCULAR; INTRAVENOUS
Status: DISCONTINUED | OUTPATIENT
Start: 2021-02-03 | End: 2021-02-03 | Stop reason: HOSPADM

## 2021-02-03 RX ORDER — HEPARIN SODIUM 5000 [USP'U]/ML
5000 INJECTION, SOLUTION INTRAVENOUS; SUBCUTANEOUS EVERY 8 HOURS
Status: DISCONTINUED | OUTPATIENT
Start: 2021-02-03 | End: 2021-02-03 | Stop reason: ALTCHOICE

## 2021-02-03 RX ORDER — ENOXAPARIN SODIUM 100 MG/ML
40 INJECTION SUBCUTANEOUS DAILY
Status: DISCONTINUED | OUTPATIENT
Start: 2021-02-04 | End: 2021-02-03 | Stop reason: DRUGHIGH

## 2021-02-03 RX ORDER — SODIUM CHLORIDE 9 MG/ML
125 INJECTION, SOLUTION INTRAVENOUS CONTINUOUS
Status: DISPENSED | OUTPATIENT
Start: 2021-02-03 | End: 2021-02-04

## 2021-02-03 RX ORDER — PHENYLEPHRINE HCL IN 0.9% NACL 0.4MG/10ML
SYRINGE (ML) INTRAVENOUS AS NEEDED
Status: DISCONTINUED | OUTPATIENT
Start: 2021-02-03 | End: 2021-02-03 | Stop reason: HOSPADM

## 2021-02-03 RX ORDER — ATORVASTATIN CALCIUM 20 MG/1
20 TABLET, FILM COATED ORAL
Status: DISCONTINUED | OUTPATIENT
Start: 2021-02-03 | End: 2021-02-05 | Stop reason: HOSPADM

## 2021-02-03 RX ORDER — HYDROXYZINE HYDROCHLORIDE 10 MG/1
10 TABLET, FILM COATED ORAL
Status: DISCONTINUED | OUTPATIENT
Start: 2021-02-03 | End: 2021-02-05 | Stop reason: HOSPADM

## 2021-02-03 RX ORDER — SODIUM CHLORIDE 9 MG/ML
25 INJECTION, SOLUTION INTRAVENOUS CONTINUOUS
Status: DISCONTINUED | OUTPATIENT
Start: 2021-02-03 | End: 2021-02-03 | Stop reason: HOSPADM

## 2021-02-03 RX ORDER — NALOXONE HYDROCHLORIDE 0.4 MG/ML
0.4 INJECTION, SOLUTION INTRAMUSCULAR; INTRAVENOUS; SUBCUTANEOUS AS NEEDED
Status: DISCONTINUED | OUTPATIENT
Start: 2021-02-03 | End: 2021-02-05 | Stop reason: HOSPADM

## 2021-02-03 RX ORDER — ROCURONIUM BROMIDE 10 MG/ML
INJECTION, SOLUTION INTRAVENOUS AS NEEDED
Status: DISCONTINUED | OUTPATIENT
Start: 2021-02-03 | End: 2021-02-03 | Stop reason: HOSPADM

## 2021-02-03 RX ORDER — FENTANYL CITRATE 50 UG/ML
50 INJECTION, SOLUTION INTRAMUSCULAR; INTRAVENOUS AS NEEDED
Status: DISCONTINUED | OUTPATIENT
Start: 2021-02-03 | End: 2021-02-03 | Stop reason: HOSPADM

## 2021-02-03 RX ORDER — ALBUMIN HUMAN 50 G/1000ML
SOLUTION INTRAVENOUS
Status: DISCONTINUED | OUTPATIENT
Start: 2021-02-03 | End: 2021-02-03

## 2021-02-03 RX ORDER — DEXTROSE 50 % IN WATER (D50W) INTRAVENOUS SYRINGE
Status: DISPENSED
Start: 2021-02-03 | End: 2021-02-04

## 2021-02-03 RX ORDER — SODIUM CHLORIDE 0.9 % (FLUSH) 0.9 %
5-40 SYRINGE (ML) INJECTION EVERY 8 HOURS
Status: DISCONTINUED | OUTPATIENT
Start: 2021-02-03 | End: 2021-02-05 | Stop reason: HOSPADM

## 2021-02-03 RX ORDER — OXYCODONE HYDROCHLORIDE 5 MG/1
5 TABLET ORAL
Status: DISCONTINUED | OUTPATIENT
Start: 2021-02-03 | End: 2021-02-05 | Stop reason: HOSPADM

## 2021-02-03 RX ORDER — SUCCINYLCHOLINE CHLORIDE 20 MG/ML
INJECTION INTRAMUSCULAR; INTRAVENOUS AS NEEDED
Status: DISCONTINUED | OUTPATIENT
Start: 2021-02-03 | End: 2021-02-03 | Stop reason: HOSPADM

## 2021-02-03 RX ORDER — ONDANSETRON 4 MG/1
4 TABLET, ORALLY DISINTEGRATING ORAL
Status: DISCONTINUED | OUTPATIENT
Start: 2021-02-03 | End: 2021-02-05 | Stop reason: HOSPADM

## 2021-02-03 RX ORDER — FACIAL-BODY WIPES
10 EACH TOPICAL DAILY PRN
Status: DISCONTINUED | OUTPATIENT
Start: 2021-02-05 | End: 2021-02-05 | Stop reason: HOSPADM

## 2021-02-03 RX ORDER — ONDANSETRON 2 MG/ML
4 INJECTION INTRAMUSCULAR; INTRAVENOUS
Status: ACTIVE | OUTPATIENT
Start: 2021-02-03 | End: 2021-02-04

## 2021-02-03 RX ORDER — SODIUM CHLORIDE 9 MG/ML
75 INJECTION, SOLUTION INTRAVENOUS CONTINUOUS
Status: DISCONTINUED | OUTPATIENT
Start: 2021-02-03 | End: 2021-02-05

## 2021-02-03 RX ORDER — ONDANSETRON 2 MG/ML
INJECTION INTRAMUSCULAR; INTRAVENOUS AS NEEDED
Status: DISCONTINUED | OUTPATIENT
Start: 2021-02-03 | End: 2021-02-03 | Stop reason: HOSPADM

## 2021-02-03 RX ORDER — MIDAZOLAM HYDROCHLORIDE 1 MG/ML
0.5 INJECTION, SOLUTION INTRAMUSCULAR; INTRAVENOUS
Status: DISCONTINUED | OUTPATIENT
Start: 2021-02-03 | End: 2021-02-03 | Stop reason: HOSPADM

## 2021-02-03 RX ORDER — POLYETHYLENE GLYCOL 3350 17 G/17G
17 POWDER, FOR SOLUTION ORAL DAILY
Status: DISCONTINUED | OUTPATIENT
Start: 2021-02-04 | End: 2021-02-05 | Stop reason: HOSPADM

## 2021-02-03 RX ORDER — HYDROMORPHONE HYDROCHLORIDE 1 MG/ML
0.2 INJECTION, SOLUTION INTRAMUSCULAR; INTRAVENOUS; SUBCUTANEOUS
Status: DISCONTINUED | OUTPATIENT
Start: 2021-02-03 | End: 2021-02-03 | Stop reason: HOSPADM

## 2021-02-03 RX ORDER — DIPHENHYDRAMINE HYDROCHLORIDE 50 MG/ML
12.5 INJECTION, SOLUTION INTRAMUSCULAR; INTRAVENOUS AS NEEDED
Status: DISCONTINUED | OUTPATIENT
Start: 2021-02-03 | End: 2021-02-03 | Stop reason: HOSPADM

## 2021-02-03 RX ORDER — PROPOFOL 10 MG/ML
INJECTION, EMULSION INTRAVENOUS AS NEEDED
Status: DISCONTINUED | OUTPATIENT
Start: 2021-02-03 | End: 2021-02-03 | Stop reason: HOSPADM

## 2021-02-03 RX ORDER — LIDOCAINE HYDROCHLORIDE 20 MG/ML
INJECTION, SOLUTION EPIDURAL; INFILTRATION; INTRACAUDAL; PERINEURAL AS NEEDED
Status: DISCONTINUED | OUTPATIENT
Start: 2021-02-03 | End: 2021-02-03 | Stop reason: HOSPADM

## 2021-02-03 RX ORDER — ACETAMINOPHEN 325 MG/1
650 TABLET ORAL EVERY 6 HOURS
Status: DISCONTINUED | OUTPATIENT
Start: 2021-02-04 | End: 2021-02-05 | Stop reason: HOSPADM

## 2021-02-03 RX ORDER — DEXAMETHASONE SODIUM PHOSPHATE 4 MG/ML
INJECTION, SOLUTION INTRA-ARTICULAR; INTRALESIONAL; INTRAMUSCULAR; INTRAVENOUS; SOFT TISSUE AS NEEDED
Status: DISCONTINUED | OUTPATIENT
Start: 2021-02-03 | End: 2021-02-03 | Stop reason: HOSPADM

## 2021-02-03 RX ORDER — FERROUS SULFATE, DRIED 160(50) MG
1 TABLET, EXTENDED RELEASE ORAL
Status: DISCONTINUED | OUTPATIENT
Start: 2021-02-04 | End: 2021-02-05 | Stop reason: HOSPADM

## 2021-02-03 RX ORDER — SCOLOPAMINE TRANSDERMAL SYSTEM 1 MG/1
1 PATCH, EXTENDED RELEASE TRANSDERMAL
COMMUNITY
End: 2021-02-05

## 2021-02-03 RX ORDER — DEXTROSE 50 % IN WATER (D50W) INTRAVENOUS SYRINGE
12.5-25 AS NEEDED
Status: DISCONTINUED | OUTPATIENT
Start: 2021-02-03 | End: 2021-02-03 | Stop reason: HOSPADM

## 2021-02-03 RX ORDER — OXYCODONE HYDROCHLORIDE 5 MG/1
2.5 TABLET ORAL
Status: DISCONTINUED | OUTPATIENT
Start: 2021-02-03 | End: 2021-02-05 | Stop reason: HOSPADM

## 2021-02-03 RX ORDER — ACETAMINOPHEN 325 MG/1
650 TABLET ORAL ONCE
Status: DISCONTINUED | OUTPATIENT
Start: 2021-02-03 | End: 2021-02-03 | Stop reason: HOSPADM

## 2021-02-03 RX ORDER — VANCOMYCIN HYDROCHLORIDE 1 G/20ML
INJECTION, POWDER, LYOPHILIZED, FOR SOLUTION INTRAVENOUS AS NEEDED
Status: DISCONTINUED | OUTPATIENT
Start: 2021-02-03 | End: 2021-02-03 | Stop reason: HOSPADM

## 2021-02-03 RX ORDER — MORPHINE SULFATE 10 MG/ML
2 INJECTION, SOLUTION INTRAMUSCULAR; INTRAVENOUS
Status: DISCONTINUED | OUTPATIENT
Start: 2021-02-03 | End: 2021-02-03 | Stop reason: HOSPADM

## 2021-02-03 RX ORDER — SODIUM CHLORIDE, SODIUM LACTATE, POTASSIUM CHLORIDE, CALCIUM CHLORIDE 600; 310; 30; 20 MG/100ML; MG/100ML; MG/100ML; MG/100ML
75 INJECTION, SOLUTION INTRAVENOUS CONTINUOUS
Status: DISCONTINUED | OUTPATIENT
Start: 2021-02-03 | End: 2021-02-03 | Stop reason: HOSPADM

## 2021-02-03 RX ORDER — ONDANSETRON 2 MG/ML
4 INJECTION INTRAMUSCULAR; INTRAVENOUS AS NEEDED
Status: DISCONTINUED | OUTPATIENT
Start: 2021-02-03 | End: 2021-02-03 | Stop reason: HOSPADM

## 2021-02-03 RX ORDER — ENOXAPARIN SODIUM 100 MG/ML
30 INJECTION SUBCUTANEOUS DAILY
Status: DISCONTINUED | OUTPATIENT
Start: 2021-02-04 | End: 2021-02-05 | Stop reason: HOSPADM

## 2021-02-03 RX ORDER — ACETAMINOPHEN 325 MG/1
650 TABLET ORAL
Status: DISCONTINUED | OUTPATIENT
Start: 2021-02-03 | End: 2021-02-05 | Stop reason: HOSPADM

## 2021-02-03 RX ORDER — LIDOCAINE HYDROCHLORIDE 10 MG/ML
0.1 INJECTION, SOLUTION EPIDURAL; INFILTRATION; INTRACAUDAL; PERINEURAL AS NEEDED
Status: DISCONTINUED | OUTPATIENT
Start: 2021-02-03 | End: 2021-02-03 | Stop reason: HOSPADM

## 2021-02-03 RX ORDER — FENTANYL CITRATE 50 UG/ML
INJECTION, SOLUTION INTRAMUSCULAR; INTRAVENOUS AS NEEDED
Status: DISCONTINUED | OUTPATIENT
Start: 2021-02-03 | End: 2021-02-03 | Stop reason: HOSPADM

## 2021-02-03 RX ORDER — NEOSTIGMINE METHYLSULFATE 1 MG/ML
INJECTION INTRAVENOUS AS NEEDED
Status: DISCONTINUED | OUTPATIENT
Start: 2021-02-03 | End: 2021-02-03 | Stop reason: HOSPADM

## 2021-02-03 RX ORDER — GLYCOPYRROLATE 0.2 MG/ML
INJECTION INTRAMUSCULAR; INTRAVENOUS AS NEEDED
Status: DISCONTINUED | OUTPATIENT
Start: 2021-02-03 | End: 2021-02-03 | Stop reason: HOSPADM

## 2021-02-03 RX ORDER — ROPIVACAINE HYDROCHLORIDE 5 MG/ML
30 INJECTION, SOLUTION EPIDURAL; INFILTRATION; PERINEURAL ONCE
Status: DISCONTINUED | OUTPATIENT
Start: 2021-02-03 | End: 2021-02-03 | Stop reason: HOSPADM

## 2021-02-03 RX ORDER — SODIUM CHLORIDE, SODIUM LACTATE, POTASSIUM CHLORIDE, CALCIUM CHLORIDE 600; 310; 30; 20 MG/100ML; MG/100ML; MG/100ML; MG/100ML
125 INJECTION, SOLUTION INTRAVENOUS CONTINUOUS
Status: DISCONTINUED | OUTPATIENT
Start: 2021-02-03 | End: 2021-02-03 | Stop reason: HOSPADM

## 2021-02-03 RX ORDER — ALBUMIN HUMAN 50 G/1000ML
SOLUTION INTRAVENOUS AS NEEDED
Status: DISCONTINUED | OUTPATIENT
Start: 2021-02-03 | End: 2021-02-03 | Stop reason: HOSPADM

## 2021-02-03 RX ADMIN — DEXTROSE MONOHYDRATE 25 G: 25 INJECTION, SOLUTION INTRAVENOUS at 19:11

## 2021-02-03 RX ADMIN — DEXAMETHASONE SODIUM PHOSPHATE 4 MG: 4 INJECTION, SOLUTION INTRAMUSCULAR; INTRAVENOUS at 17:21

## 2021-02-03 RX ADMIN — GLYCOPYRROLATE 0.4 MG: 0.2 INJECTION, SOLUTION INTRAMUSCULAR; INTRAVENOUS at 18:29

## 2021-02-03 RX ADMIN — Medication 120 MCG: at 17:20

## 2021-02-03 RX ADMIN — Medication 120 MCG: at 17:24

## 2021-02-03 RX ADMIN — PHENYLEPHRINE HYDROCHLORIDE 40 MCG/MIN: 10 INJECTION INTRAVENOUS at 17:24

## 2021-02-03 RX ADMIN — ONDANSETRON HYDROCHLORIDE 4 MG: 2 INJECTION, SOLUTION INTRAMUSCULAR; INTRAVENOUS at 17:21

## 2021-02-03 RX ADMIN — LIDOCAINE HYDROCHLORIDE 60 MG: 20 INJECTION, SOLUTION EPIDURAL; INFILTRATION; INTRACAUDAL; PERINEURAL at 17:18

## 2021-02-03 RX ADMIN — NEOSTIGMINE METHYLSULFATE 3 MG: 1 INJECTION, SOLUTION INTRAVENOUS at 18:29

## 2021-02-03 RX ADMIN — ROCURONIUM BROMIDE 5 MG: 10 SOLUTION INTRAVENOUS at 17:17

## 2021-02-03 RX ADMIN — FENTANYL CITRATE 50 MCG: 50 INJECTION, SOLUTION INTRAMUSCULAR; INTRAVENOUS at 17:17

## 2021-02-03 RX ADMIN — ALBUMIN (HUMAN) 250 ML: 12.5 INJECTION, SOLUTION INTRAVENOUS at 17:22

## 2021-02-03 RX ADMIN — ROCURONIUM BROMIDE 35 MG: 10 SOLUTION INTRAVENOUS at 17:26

## 2021-02-03 RX ADMIN — PIPERACILLIN AND TAZOBACTAM 3.38 G: 3; .375 INJECTION, POWDER, LYOPHILIZED, FOR SOLUTION INTRAVENOUS at 23:15

## 2021-02-03 RX ADMIN — PIPERACILLIN AND TAZOBACTAM 3.38 G: 3; .375 INJECTION, POWDER, LYOPHILIZED, FOR SOLUTION INTRAVENOUS at 11:45

## 2021-02-03 RX ADMIN — PROPOFOL 120 MG: 10 INJECTION, EMULSION INTRAVENOUS at 17:18

## 2021-02-03 RX ADMIN — Medication 10 ML: at 06:47

## 2021-02-03 RX ADMIN — MORPHINE SULFATE 1 MG: 2 INJECTION, SOLUTION INTRAMUSCULAR; INTRAVENOUS at 01:48

## 2021-02-03 RX ADMIN — Medication 120 MCG: at 17:18

## 2021-02-03 RX ADMIN — Medication 120 MCG: at 17:22

## 2021-02-03 RX ADMIN — SODIUM CHLORIDE 75 ML/HR: 9 INJECTION, SOLUTION INTRAVENOUS at 01:47

## 2021-02-03 RX ADMIN — SODIUM CHLORIDE 125 ML/HR: 9 INJECTION, SOLUTION INTRAVENOUS at 20:22

## 2021-02-03 RX ADMIN — SUCCINYLCHOLINE CHLORIDE 140 MG: 20 INJECTION, SOLUTION INTRAMUSCULAR; INTRAVENOUS at 17:18

## 2021-02-03 RX ADMIN — FENTANYL CITRATE 50 MCG: 50 INJECTION, SOLUTION INTRAMUSCULAR; INTRAVENOUS at 17:55

## 2021-02-03 RX ADMIN — PIPERACILLIN AND TAZOBACTAM 3.38 G: 3; .375 INJECTION, POWDER, LYOPHILIZED, FOR SOLUTION INTRAVENOUS at 04:55

## 2021-02-03 RX ADMIN — CEFAZOLIN SODIUM 2 G: 1 INJECTION, POWDER, FOR SOLUTION INTRAMUSCULAR; INTRAVENOUS at 23:15

## 2021-02-03 RX ADMIN — Medication 10 ML: at 01:47

## 2021-02-03 RX ADMIN — ALBUMIN (HUMAN) 250 ML: 12.5 INJECTION, SOLUTION INTRAVENOUS at 18:01

## 2021-02-03 NOTE — PROGRESS NOTES
ABG attempted. Patient flailing arm. RN attempted to assist with no success. Patient refusing ABG at this time.

## 2021-02-03 NOTE — BRIEF OP NOTE
Brief Postoperative Note    Patient: Cory Bee  YOB: 1921  MRN: [id number]    Date of Procedure: 2/3/2021     Pre-Op Diagnosis: RIGHT HIP FRACTURE    Post-Op Diagnosis: Same as preoperative diagnosis. Procedure(s):  RIGHT HIP HEMIARTHROPLASTY    Surgeon(s):  Stoney Borjas DO    Surgical Assistant: Surg Asst-1: Radha Mckinney    Anesthesia: General     Estimated Blood Loss (mL): less than 086     Complications: None    Specimens: * No specimens in log *     Implants:   Implant Name Type Inv.  Item Serial No.  Lot No. LRB No. Used Action   HEAD UPLR SZ 53MM CO CHROM MOD TAPR LCK ACUMATCH - [id number]  HEAD UPLR SZ 53MM CO CHROM MOD TAPR LCK ACUMATCH [id number] [employer] NA Right 1 Implanted   CENTRALIZER DSTL STEM 10MM - [id number]  CENTRALIZER DSTL STEM 10MM [id number] [employer] NA Right 1 Implanted   STEM FEM L115MM OD10MM 12/14 131DEG MARGARET + STD OFFSET ERIKA - [id number]  STEM FEM L115MM OD10MM 12/14 131DEG MARGARET + STD OFFSET ERIKA [id number] [employer] NA Right 1 Implanted   SLEEVE FEM +0MM 12/14 TI UPLR TAPR PLSM SPRY - [id number]  SLEEVE FEM +0MM 12/14 TI UPLR TAPR PLSM SPRY [id number] [employer] NA Right 1 Implanted   CEMENT BNE 40GM FULL DOSE PMMA W/ GENT HI VISC RADPQ LNG - [id number]  CEMENT BNE 40GM FULL DOSE PMMA W/ GENT HI VISC RADPQ LNG NA [facility] [employer] [id number] Right 2 Implanted       Drains: * No LDAs found *    Findings: fem neck fx    Electronically Signed by Sara Shah DO on 2/3/2021 at 6:30 PM

## 2021-02-03 NOTE — PROGRESS NOTES
[address] [facility] Adult  Hospitalist Group                                                                                          Hospitalist Progress Note  [address] [facility], DO  Answering service: [phone] from in house phone        Date of Service:  2/3/2021  NAME:  Kaylan Reynolds  :  10/29/1921  MRN:  [id number]      Admission Summary:   80 y.o. male who presents with fall. Patient son reported that patient lives at bedside along assisted living facility. 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 Patient is extremely hard of hearing and without his hearing aid cannot hear much. He reports that he has not been able to see his dad since 2020 and even at that time he appeared to be somewhat confused and he suspects that the patient is starting to have some dementia. He reports that patient is extremely stubborn and still gets up and does his chores and has had falls in the past.  Patient apparently came to the ER complaining of right hip pain, when I went to evaluate the patient he was not able to provide much history but does appear to be in pain and discomfort and is pointing to his right leg. Patient son reports that patient has had weakness on the right side due to the stroke and also reports that he is a DNR. No further history could be obtained from the patient. Patient was found to have a right femoral fracture and was requested to be admitted to the hospitalist service.     Interval history / Subjective:   Patient evaluated this morning, resting in bed, extremely hard of hearing, does not have his hearing aids, very hard to communicate, does not appear to be in acute distress     Assessment & Plan:     Right femoral fracture:   History of recurrent falls:  S/p right hip hemiarthroplasty on 2/3/2020  -pain control  -DVT prophylaxis per ortho    Concern for left lower lobe pneumonia with leukocytosis:  -on broad spectrum zosyn- recommend deescalating with clinical improvement   -Rapid COVID negative, PCR sent    Leukocytosis  Persistent, could be secondary to pneumonia, blood cultures negative thus far, will get ID consult, monitor, patient on broad-spectrum IV antibiotics    Dehydration:   -IVFs    Metabolic encephalopathy:  -secondary to above, monitor for improvement    Suspected dementia:  -Supportive care    History of stroke with chronic right-sided deficits:  -hold statin     BMI 12.4: nutrition consult       Code status: DNR  DVT prophylaxis: Heparin    Care Plan discussed with: Nurse  Anticipated Disposition: SNF/LTC  Anticipated Discharge: Greater than 48 hours     Hospital Problems  Never Reviewed          Codes Class Noted POA    Hip fracture [facility]) ICD-10-CM: S72.009A  ICD-9-CM: 820.8  2/2/2021 Unknown                Review of Systems:   Unable to obtain      Vital Signs:    Last 24hrs VS reviewed since prior progress note. Most recent are:  Visit Vitals  /77 (BP 1 Location: Right upper arm, BP Patient Position: At rest)   Pulse 89   Temp 98.2 °F (36.8 °C)   Resp 18   Ht 5' 8\" (1.727 m)   Wt 37 kg (81 lb 9.1 oz)   SpO2 92%   BMI 12.40 kg/m²       No intake or output data in the 24 hours ending 02/03/21 1228     Physical Examination:     I had a face to face encounter with this patient and independently examined them on 2/3/2021 as outlined below:          Constitutional:  No acute distress, frail, cachetic    ENT:  Oral mucosa moist, oropharynx benign.     Resp:  Scattered coarse breath sounds bilateral lung bases   CV:  Regular rhythm, normal rate, no murmurs, gallops, rubs    GI:  Soft, non distended, non tender    Musculoskeletal:  No edema, warm, 2+ pulses throughout    Neurologic:  right sided deficits            Data Review:    Review and/or order of clinical lab test  Review and/or order of tests in the radiology section of CPT  Review and/or order of tests in the medicine section of CPT      Labs:     Recent Labs 02/03/21  0213 02/02/21  1538   WBC 21.1* 14.2*   HGB 13.8 14.0   HCT 44.8 43.6    262     Recent Labs     02/03/21  0213 02/02/21  1538    138   K 4.1 4.2   * 107   CO2 26 28   BUN 23* 27*   CREA 0.58* 0.63*   GLU 68 90   CA 8.5 8.6     Recent Labs     02/03/21  0213 02/02/21  1538   ALT 23 25    100   TBILI 0.5 0.6   TP 6.4 6.4   ALB 1.7* 1.8*   GLOB 4.7* 4.6*     Recent Labs     02/02/21  1538   INR 1.0   PTP 10.9   APTT 24.5      No results for input(s): FE, TIBC, PSAT, FERR in the last 72 hours. No results found for: FOL, RBCF   No results for input(s): PH, PCO2, PO2 in the last 72 hours. No results for input(s): CPK, CKNDX, TROIQ in the last 72 hours.     No lab exists for component: CPKMB  No results found for: CHOL, CHOLX, CHLST, CHOLV, HDL, HDLP, LDL, LDLC, DLDLP, TGLX, TRIGL, TRIGP, CHHD, CHHDX  No results found for: GLUCPOC  No results found for: COLOR, APPRN, SPGRU, REFSG, ALVIN, PROTU, GLUCU, KETU, BILU, UROU, ADWOA, LEUKU, GLUKE, EPSU, BACTU, WBCU, RBCU, CASTS, UCRY      Medications Reviewed:     Current Facility-Administered Medications   Medication Dose Route Frequency    sodium chloride (NS) flush 5-40 mL  5-40 mL IntraVENous Q8H    sodium chloride (NS) flush 5-40 mL  5-40 mL IntraVENous PRN    0.9% sodium chloride infusion  75 mL/hr IntraVENous CONTINUOUS    acetaminophen (TYLENOL) tablet 650 mg  650 mg Oral Q4H PRN    heparin (porcine) injection 5,000 Units  5,000 Units SubCUTAneous Q8H    piperacillin-tazobactam (ZOSYN) 3.375 g in 0.9% sodium chloride (MBP/ADV) 100 mL MBP  3.375 g IntraVENous Q8H    morphine injection 1 mg  1 mg IntraVENous Q4H PRN     ______________________________________________________________________  EXPECTED LENGTH OF STAY: 4d 4h  ACTUAL LENGTH OF STAY:          [address] [facility]

## 2021-02-03 NOTE — PROGRESS NOTES
Physical Therapy Screening:  Services are indicated and therapy order is required s/p surgery. An InBasket screening referral was triggered for physical therapy based on results obtained during the nursing admission assessment. The patients chart was reviewed and the patient is appropriate for a skilled therapy evaluation. Please order a consult for physical therapy if you are in agreement and would like an evaluation to be completed. Thank you.     Gal Mix, PT

## 2021-02-03 NOTE — CONSULTS
Comprehensive Nutrition Assessment    Type and Reason for Visit: Consult, Initial    Nutrition Recommendations/Plan:    1. Recommend checking BG - may need D5 with NPO status. AM Glu = 68  2. Determine baseline diet (noted hx of dysphagia). Advance diet as tolerated s/p surgery/ per swallow screen - may need SLP eval to determine safest diet. If pt requesting PO, suggest minimum pureed until able to establish. Given advanced age nutrition support would be contraindicated. Hold PO with decreased alertness. 3. Add ONS (Ensure Enlive, [employer]ud[employer], [employer]) upon diet advancement      Nutrition Assessment:     80 y.o. male who has no past medical history on file. Admitted with Hip fracture ([facility] [address] Hannah Justin    Consult received for supplements. Screened earlier for low BMI. Pt NPO with plans for OR today for hip repair. Pt from NH, but unclear which one at this time. No records available, noted previous records (2019) from [facility]. Pt with hx of CVA and residual dysphagia. Unable to determine baseline diet at this time. Noted cachexia per MD waters, which would be expected with BMI of 12. Unclear of wt loss time frame, son has not seen father in over a year. Suspect age-related sarcopenia as well as poor PO intake contributing to current BMI.       Malnutrition Assessment:  Malnutrition Status:  Severe malnutrition    Context:  Chronic illness     Findings of the 6 clinical characteristics of malnutrition:   Energy Intake:  Unable to assess  Weight Loss:  Unable to assess     Body Fat Loss:  7 - Severe body fat loss,     Muscle Mass Loss:  7 - Severe muscle mass loss,    Fluid Accumulation:  No significant fluid accumulation,     Strength:  Not performed       Nutritionally Significant Medications:   NS@ 75 mL/hr, Morphine, Zosyn, Lipitor      Estimated Daily Nutrient Needs:  Energy (kcal): 1149-0506(MSJ x 1.2 x 1.2 + 250; or ~38-44 kcal/kg)  Protein (g): 65-75(1.8-2 gm/kg)  Fluid (ml/day): 1 mL/kcal(minimum 1500 mL for older adult)    Nutrition Related Findings:   Edema: none  Last BM: PTA  Abdomen: WDL    Wounds:    None       Current Nutrition Therapies:  DIET NPO  Meal intake: No data found. Anthropometric Measures:  · Height:  5' 8\" (172.7 cm)  · Current Body Wt:  37 kg (81 lb 9.1 oz)   · Admission Body Wt:  81 lb 9.1 oz    · Usual Body Wt:        · Ideal Body Wt:  154 lbs:  53 %    · BMI Category:  Underweight (BMI less than 22) age over 72       Wt Readings from Last 20 Encounters:   02/03/21 37 kg (81 lb 9.1 oz)       Nutrition Diagnosis:   · Inadequate oral intake related to cognitive or neurological impairment, swallowing difficulty(advanced age, decreased appetite) as evidenced by BMI      Nutrition Interventions:   Food and/or Nutrient Delivery: Start oral nutrition supplement(upon diet advancement)  Nutrition Education and Counseling: No recommendations at this time  Coordination of Nutrition Care: Continue to monitor while inpatient, Feeding assistance/environmental change, Interdisciplinary rounds, Swallow evaluation, Coordination of community care    Goals:  diet advancement within 24 hours       Nutrition Monitoring and Evaluation:   Behavioral-Environmental Outcomes: None identified  Food/Nutrient Intake Outcomes: Diet advancement/tolerance  Physical Signs/Symptoms Outcomes: Biochemical data, GI status, Fluid status or edema, Weight, Skin, Nutrition focused physical findings, Meal time behavior, Chewing or swallowing    Discharge Planning:     Too soon to determine     Recent Labs     02/03/21  0213 02/02/21  1538   GLU 68 90   BUN 23* 27*   CREA 0.58* 0.63*    138   K 4.1 4.2   * 107   CO2 26 28   CA 8.5 8.6       Recent Labs     02/03/21  0213 02/02/21  1538   ALT 23 25    100   TBILI 0.5 0.6   TP 6.4 6.4   ALB 1.7* 1.8*   GLOB 4.7* 4.6*       Recent Labs     02/02/21  1804   LAC 1.3       Recent Labs     02/03/21  0213 02/02/21  1538   WBC 21.1* 14.2*   HGB 13.8 14.0   HCT 44.8 43.6    262       No results for input(s): PREALB in the last 72 hours. No results for input(s): TRIGL in the last 72 hours. No results for input(s): GLUCPOC in the last 72 hours.     No results found for: HBA1C, HGBE8, YXH9ZSQJ, VTO1HOHZ, GSD4KQHY      Ute Alston RD  Available via [place] Pager# [phone]

## 2021-02-03 NOTE — ANESTHESIA PREPROCEDURE EVALUATION
Relevant Problems   No relevant active problems       Anesthetic History   No history of anesthetic complications            Review of Systems / Medical History  Patient summary reviewed, nursing notes reviewed and pertinent labs reviewed    Pulmonary  Within defined limits                 Neuro/Psych   Within defined limits           Cardiovascular  Within defined limits                     GI/Hepatic/Renal  Within defined limits              Endo/Other  Within defined limits           Other Findings              Physical Exam    Airway  Mallampati: II  TM Distance: > 6 cm  Neck ROM: normal range of motion   Mouth opening: Normal     Cardiovascular  Regular rate and rhythm,  S1 and S2 normal,  no murmur, click, rub, or gallop             Dental  No notable dental hx       Pulmonary  Breath sounds clear to auscultation               Abdominal  GI exam deferred       Other Findings            Anesthetic Plan    ASA: 3  Anesthesia type: general          Induction: Intravenous  Anesthetic plan and risks discussed with: Patient

## 2021-02-03 NOTE — PROGRESS NOTES
Ortho Daily Progress Note      Patient: Abebe Simmons                   MRN: [id number]  Sex: male  YOB: 1921           Age: 80 y.o. Met with patient again this morning  He now has his hearing aids and glasses but still not interactive  I again wrote out questions and our recommendations for treatment but got no response  I spoke with patient's son, Noe Mahoney, again at length  He understands the options for treatment of his father's hip fracture as well as the pros and cons of operative vs non-operative management.    Noe Mahoney has spoken to his brother and they wish to proceed with RIGHT HIP HEMIARTHROPLASTY later today with Dr. Jennifer Hough  Consent on the chart  Noe Mahoney would like to speak with Dr. Jennifer Hough and I gave Drake's contact info to Dr. Sirena Prieto and michaelle Mon [phone], PA  2/3/2021   12:05 PM      .

## 2021-02-03 NOTE — CONSULTS
JOINT  CONSULT    Subjective:     Date of Consultation:  February 3, 2021    Referring Physician:  GRACE Marinrosalva Devries is a 80 y.o.  male who is being seen for right hip pain. Workup has revealed femoral neck fx. Resides at nursing home. Mostly wheelchair bound    Patient Active Problem List    Diagnosis Date Noted    Hip fracture ([facility] [address] 02/02/2021     No family history on file. Social History     Tobacco Use    Smoking status: Not on file   Substance Use Topics    Alcohol use: Not on file     No past medical history on file. No past surgical history on file. Prior to Admission medications    Medication Sig Start Date End Date Taking? Authorizing Provider   atorvastatin (LIPITOR) 20 mg tablet Take 20 mg by mouth daily. Yes Provider, Historical   scopolamine (TRANSDERM-SCOP) 1 mg over 3 days pt3d 1 Patch by TransDERmal route every seventy-two (72) hours.    Yes Provider, Historical     Current Facility-Administered Medications   Medication Dose Route Frequency    sodium chloride (NS) flush 5-40 mL  5-40 mL IntraVENous Q8H    sodium chloride (NS) flush 5-40 mL  5-40 mL IntraVENous PRN    0.9% sodium chloride infusion  75 mL/hr IntraVENous CONTINUOUS    acetaminophen (TYLENOL) tablet 650 mg  650 mg Oral Q4H PRN    heparin (porcine) injection 5,000 Units  5,000 Units SubCUTAneous Q8H    piperacillin-tazobactam (ZOSYN) 3.375 g in 0.9% sodium chloride (MBP/ADV) 100 mL MBP  3.375 g IntraVENous Q8H    atorvastatin (LIPITOR) tablet 20 mg  20 mg Oral QHS    lactated Ringers infusion  125 mL/hr IntraVENous CONTINUOUS    0.9% sodium chloride infusion  25 mL/hr IntraVENous CONTINUOUS    sodium chloride (NS) flush 5-40 mL  5-40 mL IntraVENous Q8H    sodium chloride (NS) flush 5-40 mL  5-40 mL IntraVENous PRN    lidocaine (PF) (XYLOCAINE) 10 mg/mL (1 %) injection 0.1 mL  0.1 mL SubCUTAneous PRN    fentaNYL citrate (PF) injection 50 mcg  50 mcg IntraVENous PRN    midazolam (VERSED) injection 1 mg  1 mg IntraVENous PRN    midazolam (VERSED) injection 1 mg  1 mg IntraVENous PRN    acetaminophen (TYLENOL) tablet 650 mg  650 mg Oral ONCE    ropivacaine (PF) (NAROPIN) 5 mg/mL (0.5 %) injection 150 mg  30 mL Peripheral Nerve Block ONCE    morphine injection 1 mg  1 mg IntraVENous Q4H PRN     No Known Allergies     Review of Systems:  Pertinent items are noted in HPI. Objective:     Patient Vitals for the past 8 hrs:   BP Temp Pulse Resp SpO2 Height   21 1612 139/73 99.2 °F (37.3 °C) 98 18 95 %    21 1424      5' 8\" (1.727 m)   21 1413 (!) 150/79 97.8 °F (36.6 °C) 80 18 96 %      Temp (24hrs), Av.3 °F (36.8 °C), Min:97.8 °F (36.6 °C), Max:99.2 °F (37.3 °C)         X-RAYS:   XR HIP RT W OR WO PELV 2-3 VWS 2021 15:20  INDICATION: trauma. COMPARISON: None. FINDINGS:  AP view of the pelvis and a frogleg lateral view of the right hip  demonstrate an acute basicervical right femur neck fracture with foreshortening  and varus angulation. No other fractures identified and bones are diffusely  osteopenic. Degenerative spine changes are noted in the visualized lower lumbar  spine.   IMPRESSION  Acute foreshortened and varus angulated basicervical right femur     Labs:          Lab Results   Component Value Date/Time     HGB 14.0 2021 03:38 PM     WBC 14.2 (H) 2021 03:38 PM     INR 1.0 2021 03:38 PM     Albumin 1.8 (L) 2021 03:38 PM         Physical Exam:   General: 81yo male, no distress, appears stated age, does not respond to questions  CNS: presume he is confused, disoriented   Vascular: pedal pulses normal and no edema   Skin: bruising right hip  Extremities: right leg shortened and externally rotated, pain with passive right hip ROM  Neurologic: spontaneous movement of right foot             Data Review   Recent Results (from the past 24 hour(s))   CULTURE, BLOOD, PAIRED    Collection Time: 21  6:03 PM    Specimen: Blood   Result Value Ref Range Special Requests: NO SPECIAL REQUESTS      Culture result: NO GROWTH AFTER 11 HOURS     SARS-COV-2    Collection Time: 02/02/21  6:03 PM   Result Value Ref Range    SARS-CoV-2 Please find results under separate order     SARS-COV-2    Collection Time: 02/02/21  6:03 PM   Result Value Ref Range    Specimen source Nasopharyngeal      SARS-CoV-2 NOT DETECTED NOTDET     LACTIC ACID    Collection Time: 02/02/21  6:04 PM   Result Value Ref Range    Lactic acid 1.3 0.4 - 2.0 MMOL/L   METABOLIC PANEL, COMPREHENSIVE    Collection Time: 02/03/21  2:13 AM   Result Value Ref Range    Sodium 141 136 - 145 mmol/L    Potassium 4.1 3.5 - 5.1 mmol/L    Chloride 109 (H) 97 - 108 mmol/L    CO2 26 21 - 32 mmol/L    Anion gap 6 5 - 15 mmol/L    Glucose 68 65 - 100 mg/dL    BUN 23 (H) 6 - 20 MG/DL    Creatinine 0.58 (L) 0.70 - 1.30 MG/DL    BUN/Creatinine ratio 40 (H) 12 - 20      GFR est AA >60 >60 ml/min/1.73m2    GFR est non-AA >60 >60 ml/min/1.73m2    Calcium 8.5 8.5 - 10.1 MG/DL    Bilirubin, total 0.5 0.2 - 1.0 MG/DL    ALT (SGPT) 23 12 - 78 U/L    AST (SGOT) 45 (H) 15 - 37 U/L    Alk. phosphatase 103 45 - 117 U/L    Protein, total 6.4 6.4 - 8.2 g/dL    Albumin 1.7 (L) 3.5 - 5.0 g/dL    Globulin 4.7 (H) 2.0 - 4.0 g/dL    A-G Ratio 0.4 (L) 1.1 - 2.2     CBC WITH AUTOMATED DIFF    Collection Time: 02/03/21  2:13 AM   Result Value Ref Range    WBC 21.1 (H) 4.1 - 11.1 K/uL    RBC 5.07 4. 10 - 5.70 M/uL    HGB 13.8 12.1 - 17.0 g/dL    HCT 44.8 36.6 - 50.3 %    MCV 88.4 80.0 - 99.0 FL    MCH 27.2 26.0 - 34.0 PG    MCHC 30.8 30.0 - 36.5 g/dL    RDW 16.6 (H) 11.5 - 14.5 %    PLATELET 952 770 - 431 K/uL    MPV 9.8 8.9 - 12.9 FL    NRBC 0.0 0  WBC    ABSOLUTE NRBC 0.00 0.00 - 0.01 K/uL    NEUTROPHILS 82 (H) 32 - 75 %    LYMPHOCYTES 6 (L) 12 - 49 %    MONOCYTES 12 5 - 13 %    EOSINOPHILS 0 0 - 7 %    BASOPHILS 0 0 - 1 %    IMMATURE GRANULOCYTES 0 0.0 - 0.5 %    ABS. NEUTROPHILS 17.3 (H) 1.8 - 8.0 K/UL    ABS.  LYMPHOCYTES 1.3 0.8 - 3.5 K/UL    ABS. MONOCYTES 2.5 (H) 0.0 - 1.0 K/UL    ABS. EOSINOPHILS 0.0 0.0 - 0.4 K/UL    ABS. BASOPHILS 0.0 0.0 - 0.1 K/UL    ABS. IMM. GRANS. 0.0 0.00 - 0.04 K/UL    DF SMEAR SCANNED      PLATELET COMMENTS Large Platelets      RBC COMMENTS ANISOCYTOSIS  1+        RBC COMMENTS OVALOCYTES  1+        RBC COMMENTS ATYPICAL LYMPHOCYTES PRESENT           Assessment/Plan:   Assessment: Right femoral neck fracture     Plan: discussed surgical vs nonop tx. Patient not able to respond to discuss. I spoke with his son, Henri Aiken (lives in [place]), at length. He understands risks and benefits of treatment. Plan for R Hip gabe. Pain control. Npo awaiting OR. Patient is medically optimized and ready for surgical tx. The risks of surgery were explained. Risks of infection, blood loss, neurovascular injury, anesthesia risks, and risks secondary to patient comorbidities were explained.       Alma Wilson,

## 2021-02-03 NOTE — PROGRESS NOTES
Spoke with Son of Pt, Dylon Braun this morning. Son reported that patient does not have dementia, and is in fact deaf on the right side, and extremely hard of hearing on the left. He reported that the patient has fallen 10 times on the past year resulting in multiple bruises and lacerations on arms . - hx of aspiration and may require soft foods  - hx of stroke in 2019, resulting in right sided weakness and limited use of right hand  -patient practices [facility], and would like  to visit when Covid test results.      **Son bringing hearing aid, glasses, and extra batteries later today

## 2021-02-04 ENCOUNTER — APPOINTMENT (OUTPATIENT)
Dept: NON INVASIVE DIAGNOSTICS | Age: 86
DRG: 521 | End: 2021-02-04
Attending: FAMILY MEDICINE
Payer: MEDICARE

## 2021-02-04 LAB
ANION GAP SERPL CALC-SCNC: 6 MMOL/L (ref 5–15)
APPEARANCE UR: ABNORMAL
ATRIAL RATE: 93 BPM
BACTERIA URNS QL MICRO: NEGATIVE /HPF
BASOPHILS # BLD: 0 K/UL (ref 0–0.1)
BASOPHILS NFR BLD: 0 % (ref 0–1)
BILIRUB UR QL: NEGATIVE
BUN SERPL-MCNC: 23 MG/DL (ref 6–20)
BUN/CREAT SERPL: 42 (ref 12–20)
CALCIUM SERPL-MCNC: 8.3 MG/DL (ref 8.5–10.1)
CALCULATED P AXIS, ECG09: 58 DEGREES
CALCULATED R AXIS, ECG10: -6 DEGREES
CALCULATED T AXIS, ECG11: 63 DEGREES
CHLORIDE SERPL-SCNC: 114 MMOL/L (ref 97–108)
CO2 SERPL-SCNC: 22 MMOL/L (ref 21–32)
COLOR UR: ABNORMAL
CREAT SERPL-MCNC: 0.55 MG/DL (ref 0.7–1.3)
DIAGNOSIS, 93000: NORMAL
DIFFERENTIAL METHOD BLD: ABNORMAL
ECHO AO ROOT DIAM: 2.33 CM
ECHO AV AREA PEAK VELOCITY: 1.77 CM2
ECHO AV AREA/BSA PEAK VELOCITY: 1.2 CM2/M2
ECHO AV PEAK GRADIENT: 4.81 MMHG
ECHO AV PEAK VELOCITY: 109.69 CM/S
ECHO LA MAJOR AXIS: 2.81 CM
ECHO LA MINOR AXIS: 1.91 CM
ECHO LV INTERNAL DIMENSION DIASTOLIC: 3.55 CM (ref 4.2–5.9)
ECHO LV INTERNAL DIMENSION SYSTOLIC: 2.36 CM
ECHO LV IVSD: 0.73 CM (ref 0.6–1)
ECHO LV MASS 2D: 76.4 G (ref 88–224)
ECHO LV MASS INDEX 2D: 52 G/M2 (ref 49–115)
ECHO LV POSTERIOR WALL DIASTOLIC: 0.86 CM (ref 0.6–1)
ECHO LVOT DIAM: 2.02 CM
ECHO LVOT PEAK GRADIENT: 1.47 MMHG
ECHO LVOT PEAK VELOCITY: 60.65 CM/S
ECHO MV A VELOCITY: 81.37 CM/S
ECHO MV AREA PHT: 2.9 CM2
ECHO MV E DECELERATION TIME (DT): 261.43 MS
ECHO MV E VELOCITY: 53.77 CM/S
ECHO MV E/A RATIO: 0.66
ECHO MV PRESSURE HALF TIME (PHT): 75.82 MS
ECHO RV INTERNAL DIMENSION: 4.33 CM
ECHO RV TAPSE: 1.3 CM (ref 1.5–2)
ECHO TV REGURGITANT MAX VELOCITY: 319.65 CM/S
ECHO TV REGURGITANT PEAK GRADIENT: 40.87 MMHG
EOSINOPHIL # BLD: 0 K/UL (ref 0–0.4)
EOSINOPHIL NFR BLD: 0 % (ref 0–7)
EPITH CASTS URNS QL MICRO: ABNORMAL /LPF
ERYTHROCYTE [DISTWIDTH] IN BLOOD BY AUTOMATED COUNT: 16.6 % (ref 11.5–14.5)
GLUCOSE BLD STRIP.AUTO-MCNC: 108 MG/DL (ref 65–100)
GLUCOSE SERPL-MCNC: 126 MG/DL (ref 65–100)
GLUCOSE UR STRIP.AUTO-MCNC: NEGATIVE MG/DL
HCT VFR BLD AUTO: 41.4 % (ref 36.6–50.3)
HGB BLD-MCNC: 13 G/DL (ref 12.1–17)
HGB UR QL STRIP: ABNORMAL
IMM GRANULOCYTES # BLD AUTO: 0.3 K/UL (ref 0–0.04)
IMM GRANULOCYTES NFR BLD AUTO: 1 % (ref 0–0.5)
KETONES UR QL STRIP.AUTO: 15 MG/DL
LEUKOCYTE ESTERASE UR QL STRIP.AUTO: ABNORMAL
LYMPHOCYTES # BLD: 0.8 K/UL (ref 0.8–3.5)
LYMPHOCYTES NFR BLD: 3 % (ref 12–49)
MAGNESIUM SERPL-MCNC: 1.9 MG/DL (ref 1.6–2.4)
MCH RBC QN AUTO: 27.3 PG (ref 26–34)
MCHC RBC AUTO-ENTMCNC: 31.4 G/DL (ref 30–36.5)
MCV RBC AUTO: 87 FL (ref 80–99)
MONOCYTES # BLD: 1.8 K/UL (ref 0–1)
MONOCYTES NFR BLD: 7 % (ref 5–13)
NEUTS SEG # BLD: 22.5 K/UL (ref 1.8–8)
NEUTS SEG NFR BLD: 89 % (ref 32–75)
NITRITE UR QL STRIP.AUTO: NEGATIVE
NRBC # BLD: 0 K/UL (ref 0–0.01)
NRBC BLD-RTO: 0 PER 100 WBC
P-R INTERVAL, ECG05: 194 MS
PH UR STRIP: 5.5 [PH] (ref 5–8)
PHOSPHATE SERPL-MCNC: 3.2 MG/DL (ref 2.6–4.7)
PLATELET # BLD AUTO: 269 K/UL (ref 150–400)
PMV BLD AUTO: 9.7 FL (ref 8.9–12.9)
POTASSIUM SERPL-SCNC: 3.7 MMOL/L (ref 3.5–5.1)
PROT UR STRIP-MCNC: 30 MG/DL
Q-T INTERVAL, ECG07: 370 MS
QRS DURATION, ECG06: 92 MS
QTC CALCULATION (BEZET), ECG08: 460 MS
RBC # BLD AUTO: 4.76 M/UL (ref 4.1–5.7)
RBC #/AREA URNS HPF: ABNORMAL /HPF (ref 0–5)
RBC MORPH BLD: ABNORMAL
RBC MORPH BLD: ABNORMAL
SERVICE CMNT-IMP: ABNORMAL
SODIUM SERPL-SCNC: 142 MMOL/L (ref 136–145)
SP GR UR REFRACTOMETRY: >1.03
UROBILINOGEN UR QL STRIP.AUTO: 1 EU/DL (ref 0.2–1)
VENTRICULAR RATE, ECG03: 93 BPM
WBC # BLD AUTO: 25.4 K/UL (ref 4.1–11.1)
WBC URNS QL MICRO: ABNORMAL /HPF (ref 0–4)

## 2021-02-04 PROCEDURE — 93306 TTE W/DOPPLER COMPLETE: CPT

## 2021-02-04 PROCEDURE — 94760 N-INVAS EAR/PLS OXIMETRY 1: CPT

## 2021-02-04 PROCEDURE — 84100 ASSAY OF PHOSPHORUS: CPT

## 2021-02-04 PROCEDURE — 77030037877 HC DRSG MEPILEX >48IN BORD MOLN -A

## 2021-02-04 PROCEDURE — 65270000029 HC RM PRIVATE

## 2021-02-04 PROCEDURE — 74011250637 HC RX REV CODE- 250/637: Performed by: FAMILY MEDICINE

## 2021-02-04 PROCEDURE — 74011000258 HC RX REV CODE- 258: Performed by: ORTHOPAEDIC SURGERY

## 2021-02-04 PROCEDURE — 74011250637 HC RX REV CODE- 250/637: Performed by: ORTHOPAEDIC SURGERY

## 2021-02-04 PROCEDURE — 80048 BASIC METABOLIC PNL TOTAL CA: CPT

## 2021-02-04 PROCEDURE — 36415 COLL VENOUS BLD VENIPUNCTURE: CPT

## 2021-02-04 PROCEDURE — 85025 COMPLETE CBC W/AUTO DIFF WBC: CPT

## 2021-02-04 PROCEDURE — 97530 THERAPEUTIC ACTIVITIES: CPT

## 2021-02-04 PROCEDURE — 74011250636 HC RX REV CODE- 250/636: Performed by: ORTHOPAEDIC SURGERY

## 2021-02-04 PROCEDURE — 83735 ASSAY OF MAGNESIUM: CPT

## 2021-02-04 PROCEDURE — 97165 OT EVAL LOW COMPLEX 30 MIN: CPT

## 2021-02-04 PROCEDURE — 97535 SELF CARE MNGMENT TRAINING: CPT

## 2021-02-04 PROCEDURE — 81001 URINALYSIS AUTO W/SCOPE: CPT

## 2021-02-04 PROCEDURE — 51798 US URINE CAPACITY MEASURE: CPT

## 2021-02-04 PROCEDURE — 82962 GLUCOSE BLOOD TEST: CPT

## 2021-02-04 PROCEDURE — 92610 EVALUATE SWALLOWING FUNCTION: CPT | Performed by: SPEECH-LANGUAGE PATHOLOGIST

## 2021-02-04 PROCEDURE — 97162 PT EVAL MOD COMPLEX 30 MIN: CPT

## 2021-02-04 PROCEDURE — 93005 ELECTROCARDIOGRAM TRACING: CPT

## 2021-02-04 PROCEDURE — 77010033678 HC OXYGEN DAILY

## 2021-02-04 RX ORDER — BALSAM PERU/CASTOR OIL
OINTMENT (GRAM) TOPICAL EVERY 8 HOURS
Status: DISCONTINUED | OUTPATIENT
Start: 2021-02-04 | End: 2021-02-05 | Stop reason: HOSPADM

## 2021-02-04 RX ADMIN — ENOXAPARIN SODIUM 30 MG: 30 INJECTION SUBCUTANEOUS at 09:39

## 2021-02-04 RX ADMIN — ACETAMINOPHEN 650 MG: 325 TABLET ORAL at 17:36

## 2021-02-04 RX ADMIN — PIPERACILLIN AND TAZOBACTAM 3.38 G: 3; .375 INJECTION, POWDER, LYOPHILIZED, FOR SOLUTION INTRAVENOUS at 22:42

## 2021-02-04 RX ADMIN — PIPERACILLIN AND TAZOBACTAM 3.38 G: 3; .375 INJECTION, POWDER, LYOPHILIZED, FOR SOLUTION INTRAVENOUS at 13:31

## 2021-02-04 RX ADMIN — Medication: at 17:36

## 2021-02-04 RX ADMIN — Medication: at 22:46

## 2021-02-04 RX ADMIN — PIPERACILLIN AND TAZOBACTAM 3.38 G: 3; .375 INJECTION, POWDER, LYOPHILIZED, FOR SOLUTION INTRAVENOUS at 05:17

## 2021-02-04 RX ADMIN — CALCIUM CARBONATE-VITAMIN D TAB 500 MG-200 UNIT 1 TABLET: 500-200 TAB at 17:34

## 2021-02-04 RX ADMIN — ATORVASTATIN CALCIUM 20 MG: 20 TABLET, FILM COATED ORAL at 22:42

## 2021-02-04 RX ADMIN — DOCUSATE SODIUM 50 MG AND SENNOSIDES 8.6 MG 1 TABLET: 8.6; 5 TABLET, FILM COATED ORAL at 17:34

## 2021-02-04 RX ADMIN — SODIUM CHLORIDE 75 ML/HR: 9 INJECTION, SOLUTION INTRAVENOUS at 13:31

## 2021-02-04 NOTE — PROGRESS NOTES
Problem: Mobility Impaired (Adult and Pediatric)  Goal: *Acute Goals and Plan of Care (Insert Text)  Description: FUNCTIONAL STATUS PRIOR TO ADMISSION: The patient was functional at the wheelchair level and required assistance for transfers to the chair. HOME SUPPORT PRIOR TO ADMISSION: Patient lived in a nursing facility. Physical Therapy Goals  Initiated 2/4/2021  1. Patient will move from supine to sit and sit to supine  and roll side to side in bed with minimal assistance/contact guard assist within 7 day(s). 2.  Patient will transfer from bed to chair and chair to bed with minimal assistance/contact guard assist using the least restrictive device within 7 day(s). 3.  Patient will perform sit to stand with minimal assistance/contact guard assist within 7 day(s). Outcome: Progressing Towards Goal   PHYSICAL THERAPY EVALUATION  Patient: Yuli Jackson (89 y.o. male)  Date: 2/4/2021  Primary Diagnosis: Hip fracture ([facility] [address] [S72.009A]  Procedure(s) (LRB):  RIGHT HIP HEMIARTHROPLASTY (Right) 1 Day Post-Op   Precautions:   Fall, WBAT      ASSESSMENT  Based on the objective data described below, the patient presents with decreased mobility and activity tolerance after a R hip fx and hemiarthroplasty, POD1. Prior to admission, he was living at [facility] and wheelchair bound. 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 He is very hard of hearing even with his hearing aide. On exam today, he was able to tolerate sitting EOB with moderate pain only with transition to and from sitting. Note that he was more tachycardic with sitting and SpO2 decreasing with activity, as well,despite 3L nasal cannula. However, he denied any VIDAL or lightheadedness. After returning to bed, he was positioned in chair position, HR remained elevated in the 120s to 130 and SpO2 87-89% on 3L. RN and MD both notified.   Expect that he will need to return to nursing facility with assist for all mobility and ADLs once he is medically ready. We will progress to transfers OOB to chair when he is more stable. Current Level of Function Impacting Discharge (mobility/balance): total care for mobility in bed and ADLs    Functional Outcome Measure: The patient scored Total: 15/100 on the Barthel Index which is indicative of severely impaired ability to care for basic self needs/dependency on others. Other factors to consider for discharge: from [facility]     Patient will benefit from skilled therapy intervention to address the above noted impairments. PLAN :  Recommendations and Planned Interventions: bed mobility training, transfer training, therapeutic exercises, patient and family training/education, and therapeutic activities      Frequency/Duration: Patient will be followed by physical therapy:  daily to address goals. Recommendation for discharge: (in order for the patient to meet his/her long term goals)  Therapy up to 5 days/week in SNF setting    This discharge recommendation:  Has been made in collaboration with the attending provider and/or case management    IF patient discharges home will need the following DME: patient owns DME required for discharge         SUBJECTIVE:   Patient stated Don't mind my yelling.     OBJECTIVE DATA SUMMARY:   HISTORY:    No past medical history on file. No past surgical history on file.     Personal factors and/or comorbidities impacting plan of care: as noted above in assessment    Home Situation  Home Environment: Assisted living  One/Two Story Residence: One story  Living Alone: No  Support Systems: Skilled nursing facility, Friends \ neighbors, Family member(s)  Patient Expects to be Discharged to[de-identified] Skilled nursing facility  Current DME Used/Available at Home: [employer] aides, Cane, quad, Wheelchair    EXAMINATION/PRESENTATION/DECISION MAKING:   Critical Behavior:  Neurologic State: Eyes open spontaneously, Alert  Orientation Level: Unable to verbalize  Cognition: Decreased attention/concentration, Decreased command following, Follows commands, Memory loss, No command following, Poor safety awareness     Hearing: Auditory  Auditory Impairment: Hearing aid(s)  Hearing Aids/Status: Bilateral  Skin:  fragile, otherwise per nursing  Edema: mild R thigh edema  Range Of Motion:  AROM: Generally decreased, functional(RUE hemiparesis, R knee -20)                       Strength:    Strength: Generally decreased, functional(RUE 2/5, RLE 1/5, L side 3/5)                    Tone & Sensation:   Tone: Abnormal(decreased R side)              Sensation: (unable to accurately assess due to hearing limitations)               Coordination:  Coordination: Generally decreased, functional(coordinated with L side, decreased with R)  Vision:      Functional Mobility:  Bed Mobility:  Rolling: Maximum assistance; Additional time  Supine to Sit: Maximum assistance;Assist x2  Sit to Supine: Maximum assistance;Assist x2     Transfers:                             Balance:   Sitting: Impaired; Without support  Sitting - Static: Fair (occasional)  Sitting - Dynamic: Fair (occasional)  Standing: (unable to attempt)  Ambulation/Gait Training:                    Right Side Weight Bearing: As tolerated  Left Side Weight Bearing: Full                                 Stairs: Therapeutic Exercises:   Sitting balance    Functional Measure:  Barthel Index:    Bathin  Bladder: 0  Bowels: 5  Groomin  Dressin  Feedin  Mobility: 0  Stairs: 0  Toilet Use: 0  Transfer (Bed to Chair and Back): 5(inferred based on sitting balance)  Total: 15/100       The Barthel ADL Index: Guidelines  1. The index should be used as a record of what a patient does, not as a record of what a patient could do. 2. The main aim is to establish degree of independence from any help, physical or verbal, however minor and for whatever reason.   3. The need for supervision renders the patient not independent. 4. A patient's performance should be established using the best available evidence. Asking the patient, friends/relatives and nurses are the usual sources, but direct observation and common sense are also important. However direct testing is not needed. 5. Usually the patient's performance over the preceding 24-48 hours is important, but occasionally longer periods will be relevant. 6. Middle categories imply that the patient supplies over 50 per cent of the effort. 7. Use of aids to be independent is allowed. Ankita Friend., Barthel, D.W. ([id number]). Functional evaluation: the Barthel Index. [facility] (14)2. Luisa Newman buffy LTAESHA Coffey, Asad Wyatt., Mikael Armijo., Pauline, [address] (1999). Measuring the change indisability after inpatient rehabilitation; comparison of the responsiveness of the Barthel Index and Functional [place] Measure. Journal of Neurology, Neurosurgery, and Psychiatry, 66(4), 317-968. Matt Hicks NJuanJ.A, VIOLETTA Raphael, & Crispin Howard MEMERSON. (2004.) Assessment of post-stroke quality of life in cost-effectiveness studies: The usefulness of the Barthel Index and the EuroQoL-5D.  Quality of Life Research, 15, 867-15        Physical Therapy Evaluation Charge Determination   History Examination Presentation Decision-Making   HIGH Complexity :3+ comorbidities / personal factors will impact the outcome/ POC  MEDIUM Complexity : 3 Standardized tests and measures addressing body structure, function, activity limitation and / or participation in recreation  MEDIUM Complexity : Evolving with changing characteristics  LOW Complexity : FOTO score of       Based on the above components, the patient evaluation is determined to be of the following complexity level: LOW     Pain Rating:  Pain with transition from supine to and from sitting    Activity Tolerance:   Poor and desaturates with exertion and requires oxygen    After treatment patient left in no apparent distress:   Call bell within reach, Bed / chair alarm activated, Side rails x 3, and bed in chair position    COMMUNICATION/EDUCATION:   The patients plan of care was discussed with: Occupational therapist, Registered nurse, Physician, and Case management. Patient is unable to participate in goal setting and plan of care.     Thank you for this referral.  Adelita Betancourt, PT   Time Calculation: 38 mins

## 2021-02-04 NOTE — PROGRESS NOTES
Bedside and Verbal shift change report given to Blank Hickman RN (oncoming nurse) by Sabiha Lorenz RN (offgoing nurse).  Report included the following information SBAR, Kardex, ED Summary, OR Summary, Procedure Summary, Intake/Output, MAR and Recent Results.

## 2021-02-04 NOTE — PROGRESS NOTES
Bedside and Verbal shift change report given to [address] [place] [address] (oncoming nurse) by Ashtyn Colindres RN (offgoing nurse). Report included the following information SBAR, Kardex and MAR.

## 2021-02-04 NOTE — PROGRESS NOTES
0200  Primary Nurse Divine Nolasco, RN and Real Sofia RN performed a dual skin assessment on this patient Impairment noted- see wound doc flow sheet  Chris score is 15    Sacral tear  Hip incision  Red right heel  Scratch on right knee  Arms bruised bilaterally  Knees bruised  Left hip redness

## 2021-02-04 NOTE — PROGRESS NOTES
Problem: Self Care Deficits Care Plan (Adult)  Goal: *Acute Goals and Plan of Care (Insert Text)  Description: FUNCTIONAL STATUS PRIOR TO ADMISSION: The patient was functional at the wheelchair level and required minimal assistance for transfers to the chair. HOME SUPPORT: The patient lived alone with children to provide assistance. Pt lived in an skilled nursing. Occupational Therapy Goals  Initiated 2/4/2021     1. Patient will perform grooming while sitting at the EOB with set up/supervision within 7 days. 2. Patient will complete a transfer to a BSC with min A within 7 days. 3. Patient will complete a wheelchair transfer with min A within 7 days   4. Patient will sit unsupported at EOB for improved sitting balance for 10 minutes within 7 days. 5. Patient will be able to feed himself with set up/supervision within 7 days. Outcome: Progressing Towards Goal     OCCUPATIONAL THERAPY EVALUATION  Patient: Delmis Carr (50 y.o. male)  Date: 2/4/2021  [facility]: [facility] [S72.009A]  Procedure(s) (LRB):  RIGHT HIP HEMIARTHROPLASTY (Right) 1 Day Post-Op   Precautions:   Fall, WBAT    ASSESSMENT  Based on the objective data described below, the patient presents with decreased independence with functional mobility and ADL completion secondary to a R hip hemiarthroplasty following a fall. Pt needed max A assist x2 to complete bed mobility (supine to sit and sit to supine). Pt is deaf in his right ear and hard of hearing in his left, so he had trouble communicating with therapist. Continue education on functional transfers, grooming, feeding, and toileting to return to baseline. Current Level of Function Impacting Discharge (ADLs/self-care): max A x2 assist for bed mobility     Functional Outcome Measure: The patient scored 15 on the Barthel Index outcome measure which is indicative of 85% impairment in ADL completion.       Other factors to consider for discharge: safety in the home, baseline functioning     Patient will benefit from skilled therapy intervention to address the above noted impairments. PLAN :  Recommendations and Planned Interventions: self care training, functional mobility training, balance training, therapeutic activities, endurance activities, patient education, home safety training, and family training/education    Frequency/Duration: Patient will be followed by occupational therapy 5 times a week to address goals. Recommendation for discharge: (in order for the patient to meet his/her long term goals)  To be determined: discharge pending     This discharge recommendation:  Has been made in collaboration with the attending provider and/or case management    IF patient discharges home will need the following DME: patient owns DME required for discharge       SUBJECTIVE:   Patient stated if I hollar just ignore me.     OBJECTIVE DATA SUMMARY:   HISTORY:   No past medical history on file. No past surgical history on file. Expanded or extensive additional review of patient history:     Home Situation  [facility] [address] Name: [facility] Sierra   Wheelchair Ramp: Yes  One/Two Story Residence: One story  Living Alone: Yes  Support Systems: Child(annalee)  Patient Expects to be Discharged to[de-identified] Assisted living  Current DME Used/Available at Home: Wheelchair    Hand dominance: Right    EXAMINATION OF PERFORMANCE DEFICITS:  Cognitive/Behavioral Status:  Neurologic State: Alert; Appropriate for age  Orientation Level: Oriented to person;Oriented to place;Oriented to situation  Cognition: Follows commands  Perception: Appears intact  Perseveration: No perseveration noted       Skin: skin appeared in tact    Edema: no edema noted    Hearing:   Auditory  Auditory Impairment: Hard of hearing, bilateral, Hearing aid(s)(Hearing aid in left ear and deaf in the right )  Hearing Aids/Status: Left    Vision/Perceptual:                           Acuity: Able to read employee name badelida without difficulty    Corrective Lenses: Glasses    Range of Motion:  AROM: Generally decreased, functional(RUE hemiparesis, R knee -20)  PROM: Generally decreased, functional                      Strength:  Strength: Generally decreased, functional(RUE 2/5, RLE 1/5, L side 3/5)                Coordination:  Coordination: Generally decreased, functional(coordinated with L side, decreased with R)  Fine Motor Skills-Upper: Right Impaired;Left Intact    Gross Motor Skills-Upper: Right Impaired;Left Intact    Tone & Sensation:  Tone: Abnormal(decreased R side)  Sensation: (unable to accurately assess due to hearing limitations)                      Balance:  Sitting: Impaired; Without support  Sitting - Static: Fair (occasional)  Sitting - Dynamic: Fair (occasional)  Standing: (unable to attempt)    Functional Mobility and Transfers for ADLs:  Bed Mobility:  Rolling: Maximum assistance; Additional time  Supine to Sit: Maximum assistance;Assist x2  Sit to Supine: Maximum assistance;Assist x2    Transfers:  Sit to Stand: (unable to attempt)  Stand to Sit: (unable to attempt)  Bed to Chair: (unable to attempt)  Bathroom Mobility: (unable to attempt)  Toilet Transfer : (unable to attempt)    ADL Assessment:  Feeding: (NPO orders)    Oral Facial Hygiene/Grooming: Stand-by assistance    Bathing: (unable to attempt)    Upper Body Dressing: (unable to attempt)    Lower Body Dressing: (unable to attempt)    Toileting: (unable to attempt)                ADL Intervention and task modifications:  Pt was laying supine in bed when OT entered the room. Patient needed max A and additional time in order to roll. Pt needed max A with assist x2 in order to complete supine to sit and sit to supine. Pt was sitting at the EOB and was able to maintain sitting balance without support. While sitting at the EOB pt was given a cloth and was able to wash his face with the washcloth with only SBA.  Other ADLs were not attempted on this date because the patients O2 and HR were fluctuating during the session. Functional Measure:  Barthel Index:    Bathin  Bladder: 0  Bowels: 5  Groomin  Dressin  Feedin  Mobility: 0  Stairs: 0  Toilet Use: 0  Transfer (Bed to Chair and Back): 5  Total: 15/100        The Barthel ADL Index: Guidelines  1. The index should be used as a record of what a patient does, not as a record of what a patient could do. 2. The main aim is to establish degree of independence from any help, physical or verbal, however minor and for whatever reason. 3. The need for supervision renders the patient not independent. 4. A patient's performance should be established using the best available evidence. Asking the patient, friends/relatives and nurses are the usual sources, but direct observation and common sense are also important. However direct testing is not needed. 5. Usually the patient's performance over the preceding 24-48 hours is important, but occasionally longer periods will be relevant. 6. Middle categories imply that the patient supplies over 50 per cent of the effort. 7. Use of aids to be independent is allowed. Arnulfo Nazlini., Barthel, DJuanW. (0323). Functional evaluation: the Barthel Index. [address] [facility] (14)2. [facility] buffy LATESHA Coffey, Joseph BrewsterOhio[facility]., Alis Benitez., Pauline, [address] [place] [address] (). Measuring the change indisability after inpatient rehabilitation; comparison of the responsiveness of the Barthel Index and Functional [place] Measure. Journal of Neurology, Neurosurgery, and Psychiatry, 66(4), 260-389. Gertrude Wilson, [place].HAYLEE, VIOLETTA Raphael, & Ashu Rascon M.A. (2004.) Assessment of post-stroke quality of life in cost-effectiveness studies: The usefulness of the Barthel Index and the EuroQoL-5D.  Quality of Life Research, 15, 002-10         Occupational Therapy Evaluation Charge Determination   History Examination Decision-Making   MEDIUM Complexity : Expanded review of history including physical, cognitive and psychosocial  history  HIGH Complexity : 5 or more performance deficits relating to physical, cognitive , or psychosocial skils that result in activity limitations and / or participation restrictions HIGH Complexity : Patient presents with comorbidities that affect occupational performance. Signifigant modification of tasks or assistance (eg, physical or verbal) with assessment (s) is necessary to enable patient to complete evaluation       Based on the above components, the patient evaluation is determined to be of the following complexity level: MEDIUM  Pain Rating:  No pain indicated on this date. Activity Tolerance:   Poor    After treatment patient left in no apparent distress:    Seated bed position     COMMUNICATION/EDUCATION:   The patients plan of care was discussed with: Physical therapist, Registered nurse, and Case management. Patient/family have participated as able in goal setting and plan of care. This patients plan of care is appropriate for delegation to [facility]. Thank you for this referral.  Simone Marshall    Regarding student involvement in patient care:  A student participated in this treatment session. Per CMS Medicare statements and AOTA guidelines I certify that the following was true:  1. I was present and directly observed the entire session. 2. I made all skilled judgments and clinical decisions regarding care. 3. I am the practitioner responsible for assessment, treatment, and documentation.       Daron Jean OT

## 2021-02-04 NOTE — DISCHARGE INSTRUCTIONS
Post op Discharge Instructions Hip Fracture  Davey Smith, [address] [facility]  [phone]    Patient Name: Paul Worthington  Date of admission:  2/2/2021  Date of procedure: 2/3/2021   Procedure: Procedure(s):  RIGHT HIP HEMIARTHROPLASTY  PCP: UNKNOWN  Date of discharge: No discharge date for patient encounter. Follow up office visit   See Dr. Chloe Sheldon approximately 3-4 weeks from date of surgery. Call [phone] to make an appointment. Activity  Walk with your walker with weight bearing restrictions as instructed by your physical therapist.  Continue using your walker until seen for follow-up visit. You should walk daily with the physical therapist.  Perform your exercise routine 3 times a day as instructed by the physical therapist.    Incision Care  The Aquacel (brown, waterproof) surgical dressing is to remain on the hip for 7 days. On the 7th day gently peel the dressing off by carefully lifting the edge and stretching it slightly to break the adhesive seal.    If your Aquacel dressing comes loose/off before the 7th day, you may replace it with a dry sterile gauze dressing; change it daily. Once the incision is not draining, leave it open to air. You may take a shower with the Aquacel dressing in place. Once the Aquacel is removed,  may shower and get your incision wet but do not submerge your incision under water in a bath tub, hot tub or swimming pool for 6 weeks after surgery. Preventing blood clots  Lovenox injections 30mg sub q once daily for 4 weeks following your surgery date      Pain management  - Please take scheduled 650 mg tylenol every 6 hours for 6 weeks  - Please take oxycodone  every 6 hours for pain as needed for pain. - Avoid alcoholic beverages while taking pain medication  - Do not take any over-the-counter medication for pain except Tylenol (acetaminophen)  - Please be aware that many medications contain Tylenol.   We do not want you to over medicate so please read the information below as a guide. Do not take more than 4 Grams of Tylenol in a 24 hour  - You may place an ice bag on your knee for 15-20 minutes after exercising and as needed throughout the day and night      Diet  Resume usual diet; encourage fluids; provide foods high in fiber, calcium and vitamin D  Provide stool softeners/laxatives as needed      After [address]  for SNF/Rehab (to be followed by post-acute provider)    Nursing  Complete head to toe assessment, vital signs  Medication reconciliation  Review pain management  Manage chronic medical conditions  Remove Aquacel dressing 7 days after surgery    Physical Therapy-status at discharge from the hospital    Weight bearing status:             Mobility Status:                Gait:                ADL status overall composite:                Physical Therapy-exercises, transfers, gait-training (partial weight bearing on the surgical leg)    Exercises related to strengthening the surgical hip:  Supine Exercises: Ankle pumps  Quad Sets  Gluteal Sets  Hip Abduction slides supine  Hip external rotation  Heel Slides    Standing Exercises:  Heel Raises  Mini-squats  Heel/toe touches and knee bend  Marching   Hip Abduction  Hip External Rotation  Hip Extension    Advance exercises with equipment for strengthening, flexibility, balance needs as appropriate per setting. Avoid active hip flexion exercised for 4 weeks. Repetitions and number of sets to be established per patient tolerance     Reasons to call the Surgeon  1. Increased redness, swelling or drainage from the incision site  2. Temperature consistently greater than 101 degrees  3.   Increased pain or unrelieved pain in hip or calf

## 2021-02-04 NOTE — ANESTHESIA POSTPROCEDURE EVALUATION
Post-Anesthesia Evaluation and Assessment    Patient: Fred Schmitz MRN: [id number]  SSN: [id number]    YOB: 1921  Age: 80 y.o. Sex: male      I have evaluated the patient and they are stable and ready for discharge from the PACU. Cardiovascular Function/Vital Signs  Visit Vitals  BP (!) 144/72   Pulse 96   Temp 36.4 °C (97.5 °F)   Resp 18   Ht 5' 8\" (1.727 m)   Wt 37 kg (81 lb 9.1 oz)   SpO2 93%   BMI 12.40 kg/m²       Patient is status post General anesthesia for Procedure(s):  RIGHT HIP HEMIARTHROPLASTY. Nausea/Vomiting: None    Postoperative hydration reviewed and adequate. Pain:  Pain Scale 1: Adult Nonverbal Pain Scale (02/03/21 1853)  Pain Intensity 1: 0 (02/03/21 1853)   Managed    Neurological Status:   Neuro (WDL): Exceptions to WDL (02/03/21 1853)  Neuro  Neurologic State: Eyes do not open to any stimulus (02/03/21 1853)  Orientation Level: Unable to verbalize (02/03/21 8170)  Cognition: No command following (02/03/21 0833)  Speech: Clear (02/02/21 2059)  LUE Motor Response: Weak;Purposeful (02/03/21 0833)  LLE Motor Response: Weak;Purposeful (02/03/21 0833)  RUE Motor Response: Spontaneous  (02/03/21 4798)  RLE Motor Response: Other(comment) (02/02/21 2059)   At baseline    Mental Status, Level of Consciousness: Alert and  oriented to person, place, and time    Pulmonary Status:   O2 Device: Nasal cannula (02/03/21 1853)   Adequate oxygenation and airway patent    Complications related to anesthesia: None    Post-anesthesia assessment completed. No concerns    Signed By: Leone Boast, MD     February 3, 2021              Procedure(s):  RIGHT HIP HEMIARTHROPLASTY. general    <BSHSIANPOST>    INITIAL Post-op Vital signs:   Vitals Value Taken Time   /72 02/03/21 1945   Temp 36.4 °C (97.5 °F) 02/03/21 1853   Pulse 96 02/03/21 1959   Resp 20 02/03/21 1959   SpO2 92 % 02/03/21 1959   Vitals shown include unvalidated device data.

## 2021-02-04 NOTE — PROGRESS NOTES
Orthopedic Progress Note    S: Pain well controlled, pt states he has no pain; difficulty to understand patient due to lack of phonation; as best I can understand he denies numbness, tingling, focal weakness, cp, sob, fever, chills    O: NAD, respirations unlabored; holding leg in flexion, externally rotated, able to rotate internally, but knee unable to completely extend, appears to be chronic contracture; Dressings dry and intact, small stain; thigh soft, no edema, no posterior calf ttp; DF/PF present, SILT; Cap refill brisk, foot warm, DP2+    Patient Vitals for the past 4 hrs:   Temp Pulse BP   02/04/21 0751 (!) 96.3 °F (35.7 °C) 96 (!) 161/84     Recent Labs     02/04/21  0553 02/04/21  0435 02/03/21  0213   HGB 13.0  --  13.8   HCT 41.4  --  44.8     --  256   BUN  --  23* 23*   CREA  --  0.55* 0.58*   K  --  3.7 4.1   NA  --  142 141       XR HIP RT W OR WO PELV  1 VW  Narrative: EXAM: XR HIP RT W OR WO PELV  1 VW    INDICATION: s/p R Hip gabe.    COMPARISON: Right hip radiograph 2/2/2021.    FINDINGS: AP single view of the right hip demonstrates postsurgical changes of  right hip hemiarthroplasty. Expected swelling and air within the soft tissues of  the right hip. Skin staples are noted. No acute hardware complication is  identified.  Impression: Status post right hip hemiarthroplasty without immediate complication.       A/P:  Procedure: Procedure(s):  RIGHT HIP HEMIARTHROPLASTY  Post Op day: 1 Day Post-Op    - DVT ppx - Lovenox 30mg daily in hospital; SCDs  - PT/OT - WBAT  - Pain - Tylenol anjali, Oxycodone PRN; Ice, Elevation, Ace wrap as needed  - Dressing - Leave in place if it remains dry and intact; change as needed for saturation with dry sterile island dressing  - Dispo - Pending PT/OT recs; needs f/u in 3 weeks with Dr. Diggs; refer to DC instructions for further details.    TUNDE Kim  Orthopedic Trauma Service  [facility]

## 2021-02-04 NOTE — PROGRESS NOTES
KATHY: d/c to [facility] SNF-referral pending in [place]; Negative Covid test 2/2; BLS transport; BLS Transport(pt is w/c bound and has history of Stroke); IMM provided 2/4    RUR: 14%  Reason for Admission:   S/p Right Hip hemiarthoplasty                   RUR Score:       14%              Plan for utilizing home health:    Plan for SNF placement at [facility]      PCP: First and Last name: Dr. Rashmi Marr    Name of Practice:    Are you a current patient: Yes/No: YES   Approximate date of last visit: 1 month   Can you participate in a virtual visit with your PCP:                   NO  Current Advanced Directive/Advance Care Plan: no amd on file; pt is a DNR                         Transition of Care Plan:                    1630-CM reviewed pt chart & contacted pt's son, Vick Argueta [phone] to discuss transitional planning. Pt resides at [address] since 2019. As per son, pt is w/c bound, has [employer] O2 concentrator for which he rents for pt's comfort and hospital bed. CM to follow for transitions of care. Daxa Amin RN BSN CCM      Transition of Care Plan:     The Plan for Transition of Care is related to the following treatment goals: SNF    The Patient and/or patient representative  was provided with a choice of provider and agrees  with the discharge plan. Yes [x] No []    A Freedom of choice list was provided with basic dialogue that supports the patient's individualized plan of care/goals and shares the quality data associated with the providers. Yes [x] No []  Medicare pt has received, reviewed, and signed 1st IM letter informing them of their right to appeal the discharge. Signed copy has been placed on pt bedside chart.   Care Management Interventions  Mode of Transport at Discharge: BLS  Transition of Care Consult (CM Consult): Discharge Planning, SNF  MyChart Signup: No  Discharge Durable Medical Equipment: No(pt owns a wheelchair)  Health Maintenance Reviewed: Yes  Physical Therapy Consult: Yes  Occupational Therapy Consult: Yes  Current Support Network: Assisted Living  Confirm Follow Up Transport: (BLS transport )  The Plan for Transition of Care is Related to the Following Treatment Goals : surgical and therapy clearance   The Patient and/or Patient Representative was Provided with a Choice of Provider and Agrees with the Discharge Plan?: Yes  Name of the Patient Representative Who was Provided with a Choice of Provider and Agrees with the Discharge Plan: patient   Freedom of Choice List was Provided with Basic Dialogue that Supports the Patient's Individualized Plan of Care/Goals, Treatment Preferences and Shares the Quality Data Associated with the Providers?: Yes  Discharge Location  Discharge Placement: Skilled nursing facility  Batool Vazquez RN BSN CCM

## 2021-02-04 NOTE — OP NOTES
[address]  OPERATIVE REPORT    Name:  Munira Neff  MR#:  [id number]  :  10/29/1921  ACCOUNT #:  [de-identified]  DATE OF SERVICE:  2021    PREOPERATIVE DIAGNOSIS:  Right femoral neck fracture. POSTOPERATIVE DIAGNOSIS:  Right femoral neck fracture. PROCEDURE PERFORMED:  Right hip hemiarthroplasty. SURGEON:  Brandon Torres DO    ASSISTANT:  [facility] staff. ANESTHESIA:  General.    COMPLICATIONS:  None. SPECIMENS REMOVED:  None. IMPLANTS:  exactech    ESTIMATED BLOOD LOSS:  100 mL. DISPOSITION:  Stable to PACU. INDICATIONS FOR THE PROCEDURE:  The patient is a 80-year-old male, who resides at a nursing facility, who presented to the hospital after he complained of right hip pain and a possible injury at his nursing facility. He was found to have evidence of the right femoral neck fracture on imaging. Orthopedics was consulted for management of his hip fracture and he was seen by the hospitalist and admitted for medical optimization and clearance. We discussed treatment options with him and his son. He seemed to have some dementia and therefore, relative decision making went through his son. We discussed conservative versus surgical treatment. Risks and benefits of right hip hemiarthroplasty were explained. Risks of infection, blood loss, neurovascular injury, and risks secondary to the patient's comorbidities were described. He was seen in the preoperative holding area prior to the procedure, and history and physical forms and consent forms were confirmed in his chart. His operative extremity was marked by Orthopedics. PROCEDURE:  The patient was taken to the OR and transferred to the operating room table. He was placed in the supine position initially and all prominences were carefully padded. He was given sedation and intubated by Anesthesia. We turned him into a lateral decubitus position with the right hip facing upward.   We used a pegboard to assist with positioning and ensured all prominences were carefully padded including axillary roll. We prepped and draped in a normal sterile fashion. After sterile prepping and draping, a time-out was called. The patient was identified by name, date of birth, operative site, and operative extremity. After all were in agreement that the right hip was the operative extremity, an incision was made for posterior approach of the hip. Careful dissection down to avoid neurovascular structures was performed and we gained access to the tensor fascia layer, which was cleared with a Ruiz elevator to allow for layer closure. We identified the rotators of the hip and incised the external rotators and capsular layer after we incised the tensor fascia layer and put Charnley hip retractor. We tagged the rotators and capsule for later closure. We were able to remove the femoral head without difficulty and then sized to a size 53, which gave us excellent overall fit and stability. We did not need to make a femoral neck cut as its fracture was at the appropriate level, but we did use a calcar planer after we started broaching the hip. We used the neck elevator to expose the femoral neck and began by using a canal finder and lateralizing reamer. We then started broaching. We broached up to a size 10. Based on his age and osteoporotic bone, I did not want to keep pushing by this and increasing in size as we did have adequate overall fit with a size 10. We chose to cement the size 10 down. We did trial first.  We trialed with a standard neck and plus 0 unipolar head size 53, which gave us good overall restoration of leg length and excellent overall stability. We then thoroughly irrigated and cemented in a size 10 Exactech femoral stem without difficulty. After cementing stem, we trialed once again to confirm that a +0 size 53 head would be the appropriate unipolar head.   We placed final component and thoroughly irrigated once again. We placed 1 g vancomycin powder into the wound and closed with a combination of #5 Ethibond stitch for the capsular layer and rotators, #1 Stratafix suture for the tensor fascia layer, and 2-0 Vicryl and staples for the subcutaneous tissues and skin. Sterile dressing was applied. He will be transferred to PACU and monitored closely in the recovery unit. We will obtain postoperative x-rays. He will be monitored while in the hospital and likely present to a rehab facility in the postoperative period. We will start him on Lovenox for DVT prophylaxis.         Lexi Enciso DO      [id number]  D:  02/03/2021 18:42  T:  02/04/2021 4:27  JOB #:  [id number]

## 2021-02-04 NOTE — PROGRESS NOTES
Problem: Dysphagia (Adult)  Goal: *Acute Goals and Plan of Care (Insert Text)  Description: Initiated 2/4/2021  1. Patient will tolerate full liquid diet free of s/s of aspiration within 7 days. 2. Patient will tolerate solid trials free of difficulty within 7 days. Outcome: Not Met   SPEECH LANGUAGE PATHOLOGY BEDSIDE SWALLOW EVALUATION  Patient: Fred Schmitz (69 y.o. male)  Date: 2/4/2021  Primary Diagnosis: Hip fracture ([facility] [address] [S72.009A]  Procedure(s) (LRB):  RIGHT HIP HEMIARTHROPLASTY (Right) 1 Day Post-Op   Precautions:    Fall, WBAT    ASSESSMENT :  Based on the objective data described below, the patient presents with suspect mild oropharyngeal dysphagia based on clinical examination. He has a R facial droop from previous CVA. He was initially unable to suck via straw, though this improved once his mouth was moisturized. There was overt coughing on the initial sip of thins, though this was not reproduced with repeated trials. Adequate bolus manipulation and no s/s of aspiration with purees. He refused solid trials. Discussed patient's history with two sons, one in person and one via zoom. They both report the patient has known aspiration risks and was supervised with all meals while at [facility]. He has been tolerating a mechanical soft diet with thin liquids, at [facility] with no known respiratory infections until current chest CT showing, \"Left greater than right posterior bibasilar airspace infiltrates concerning for possible pneumonic infiltrate on the left than the right superimposed on chronic fibrotic change. \"     Patient will benefit from skilled intervention to address the above impairments.   Patients rehabilitation potential is considered to be Good     PLAN :  Recommendations and Planned Interventions:  Full liquid diet, thin liquids for now, will advance as appropriate  Frequency/Duration: Patient will be followed by speech-language pathology 3 times a week to address goals.  Discharge Recommendations: To Be Determined     SUBJECTIVE:   Patient stated I can't see up here,. Patient's glasses appear damaged. His son is aware. OBJECTIVE:   No past medical history on file. No past surgical history on file. Prior Level of Function/Home Situation:    Home Situation  Home Environment: [address] [place] [address] Road Name: [facility]   Wheelchair Ramp: Yes  One/Two Story Residence: One story  Living Alone: Yes  Support Systems: Child(annalee)  Patient Expects to be Discharged to[de-identified] Assisted living  Current DME Used/Available at Home: Wheelchair  Diet prior to admission: mechanical soft, thins  Current Diet:  NPO   Cognitive and Communication Status:  Neurologic State: Alert  Orientation Level: Oriented to person, Oriented to place, Oriented to situation  Cognition: (very [place] and difficult for him to understand commands)  Perception: Appears intact  Perseveration: No perseveration noted     Oral Assessment:  Oral Assessment  Labial: Right droop  Dentition: Natural;Intact  Oral Hygiene: very dry  Lingual: (unable to visualize)  Mandible: No impairment  P.O. Trials:  Patient Position: upright in bed  Vocal quality prior to P.O.: No impairment  Consistency Presented: Puree; Thin liquid  How Presented: Self-fed/presented;SLP-fed/presented;Cup/sip;Spoon;Straw     Bolus Acceptance: No impairment(initially unable to suck via straw, this resolved after mouth was moisturized)  Bolus Formation/Control: No impairment     Propulsion: No impairment  Oral Residue: None        Aspiration Signs/Symptoms: (cough on initial sip of thins)                        NOMS:   The NOMS functional outcome measure was used to quantify this patient's level of swallowing impairment. Based on the NOMS, the patient was determined to be at level 4 for swallow function       NOMS Swallowing Levels:  Level 1 (CN): NPO  Level 2 (CM): NPO but takes consistency in therapy  Level 3 (CL): Takes less than 50% of nutrition p.o. and continues with nonoral feedings; and/or safe with mod cues; and/or max diet restriction  Level 4 (CK): Safe swallow but needs mod cues; and/or mod diet restriction; and/or still requires some nonoral feeding/supplements  Level 5 (CJ): Safe swallow with min diet restriction; and/or needs min cues  Level 6 (CI): Independent with p.o.; rare cues; usually self cues; may need to avoid some foods or needs extra time  Level 7 ([address] [place] [address]): Independent for all p.o.  GUSTAVO. (2003). National Outcomes Measurement System (NOMS): Adult Speech-Language Pathology User's Guide. After treatment:   Patient left in no apparent distress in bed, Call bell within reach, Nursing notified, and Caregiver / family present    COMMUNICATION/EDUCATION:   Patient was educated regarding role of SLP and plan. The patient could not hear me but his sons verbalized understanding. The patient's plan of care including recommendations, planned interventions, and recommended diet changes were discussed with: Registered nurse. Patient/family agree to work toward stated goals and plan of care.     Thank you for this referral.  Krunal Ernst, SLP

## 2021-02-04 NOTE — WOUND CARE
Wound Care Note:  
 
New consult placed by nurse request for sacrum Chart shows: 
Admitted for hip fracture s/p right hip hemiarthroplasty No past medical history on file. WBC = 25.4 on 2/4/21 Admitted from a faclity Assessment:  
Patient is very hard of hearing, not too much communication, incontinent with moderate assistance needed in repositioning. Bed: [employer] Patient wearing briefs for incontinence Diet: NPO Patient reports pain, RN notified; patent fell asleep as soon as repositiomed Bilateral heels, buttocks, and sacral skin intact and without erythema. 1. POA sacrum, buttock and heels with blanchable erythema. Venelex ointment to be ordered. 2.  POA small area of moisture associated skin damage to gluteal cleft, approximately 0.8 cm x 0.8 cm x 0.1 cm, no drainage, tender, Venelex ointment to be ordered. Spoke with Dr. Lian Abraham, wound care orders obtained. Patient repositioned on left side; waffle cushion placed under buttocks. Heels offloaded on pillows. Recommendations:   
Sacrum, bilateral buttocks, gluteal cleft and bilateral heels- Every 8 hours liberally apply Venelex ointment (BPCO) Skin Care & Pressure Prevention: 
Minimize layers of linen/pads under patient to optimize support surface. Turn/reposition approximately every 2 hours and offload heels. Manage incontinence / promote continence Nourishing Skin Cream to dry skin, minimize use of briefs when able Discussed above plan with patient & Chucho Roldan RN Transition of Care: Plan to follow as needed while admitted to hospital. 
 
JOE Cortez, RN, [facility], [facility]. 
office [phone] 
pager [phone] or call  to page

## 2021-02-04 NOTE — PERIOP NOTES
TRANSFER - OUT REPORT:    Verbal report given to [facility] RN(name) on Croy Bee  being transferred to [facility](unit) for routine post - op       Report consisted of patients Situation, Background, Assessment and   Recommendations(SBAR). Time Pre op antibiotic given:1145  Anesthesia Stop time: 1847  Galarza Present on Transfer to floor:yes  Order for Galarza on Chart:yes  Discharge Prescriptions with Chart:no    Information from the following report(s) SBAR, OR Summary, Procedure Summary, Intake/Output, MAR, Accordion, Recent Results, Med Rec Status and Cardiac Rhythm nsr was reviewed with the receiving nurse. Opportunity for questions and clarification was provided. Is the patient on 02? YES       L/Min 3       Other nc    Is the patient on a monitor? NO    Is the nurse transporting with the patient? NO    Surgical Waiting Area notified of patient's transfer from PACU?  YES      The following personal items collected during your admission accompanied patient upon transfer:   Dental Appliance: Dental Appliances: None  Vision: Visual Aid: Glasses, At home  Hearing Aid: Hearing Aid: At home  Jewelry:    Clothing:    Other Valuables:    Valuables sent to safe:

## 2021-02-05 ENCOUNTER — APPOINTMENT (OUTPATIENT)
Dept: GENERAL RADIOLOGY | Age: 86
DRG: 521 | End: 2021-02-05
Attending: INTERNAL MEDICINE
Payer: MEDICARE

## 2021-02-05 VITALS
WEIGHT: 88.1 LBS | SYSTOLIC BLOOD PRESSURE: 129 MMHG | TEMPERATURE: 97.7 F | HEIGHT: 68 IN | OXYGEN SATURATION: 100 % | RESPIRATION RATE: 14 BRPM | HEART RATE: 94 BPM | BODY MASS INDEX: 13.35 KG/M2 | DIASTOLIC BLOOD PRESSURE: 67 MMHG

## 2021-02-05 LAB
ALBUMIN SERPL-MCNC: 1.6 G/DL (ref 3.5–5)
ALBUMIN/GLOB SERPL: 0.5 {RATIO} (ref 1.1–2.2)
ALP SERPL-CCNC: 73 U/L (ref 45–117)
ALT SERPL-CCNC: 16 U/L (ref 12–78)
ANION GAP SERPL CALC-SCNC: 6 MMOL/L (ref 5–15)
AST SERPL-CCNC: 28 U/L (ref 15–37)
BASOPHILS # BLD: 0 K/UL (ref 0–0.1)
BASOPHILS NFR BLD: 0 % (ref 0–1)
BILIRUB SERPL-MCNC: 0.5 MG/DL (ref 0.2–1)
BUN SERPL-MCNC: 19 MG/DL (ref 6–20)
BUN/CREAT SERPL: 58 (ref 12–20)
CALCIUM SERPL-MCNC: 8 MG/DL (ref 8.5–10.1)
CHLORIDE SERPL-SCNC: 115 MMOL/L (ref 97–108)
CO2 SERPL-SCNC: 26 MMOL/L (ref 21–32)
CREAT SERPL-MCNC: 0.33 MG/DL (ref 0.7–1.3)
DIFFERENTIAL METHOD BLD: ABNORMAL
EOSINOPHIL # BLD: 0.1 K/UL (ref 0–0.4)
EOSINOPHIL NFR BLD: 0 % (ref 0–7)
ERYTHROCYTE [DISTWIDTH] IN BLOOD BY AUTOMATED COUNT: 16.2 % (ref 11.5–14.5)
GLOBULIN SER CALC-MCNC: 3.5 G/DL (ref 2–4)
GLUCOSE SERPL-MCNC: 105 MG/DL (ref 65–100)
HCT VFR BLD AUTO: 37.1 % (ref 36.6–50.3)
HGB BLD-MCNC: 11.5 G/DL (ref 12.1–17)
IMM GRANULOCYTES # BLD AUTO: 0.1 K/UL (ref 0–0.04)
IMM GRANULOCYTES NFR BLD AUTO: 0 % (ref 0–0.5)
LYMPHOCYTES # BLD: 0.9 K/UL (ref 0.8–3.5)
LYMPHOCYTES NFR BLD: 5 % (ref 12–49)
MCH RBC QN AUTO: 27.7 PG (ref 26–34)
MCHC RBC AUTO-ENTMCNC: 31 G/DL (ref 30–36.5)
MCV RBC AUTO: 89.4 FL (ref 80–99)
MONOCYTES # BLD: 1.4 K/UL (ref 0–1)
MONOCYTES NFR BLD: 9 % (ref 5–13)
NEUTS SEG # BLD: 13.5 K/UL (ref 1.8–8)
NEUTS SEG NFR BLD: 86 % (ref 32–75)
NRBC # BLD: 0 K/UL (ref 0–0.01)
NRBC BLD-RTO: 0 PER 100 WBC
PLATELET # BLD AUTO: 218 K/UL (ref 150–400)
PMV BLD AUTO: 10.1 FL (ref 8.9–12.9)
POTASSIUM SERPL-SCNC: 3.2 MMOL/L (ref 3.5–5.1)
PROT SERPL-MCNC: 5.1 G/DL (ref 6.4–8.2)
RBC # BLD AUTO: 4.15 M/UL (ref 4.1–5.7)
SODIUM SERPL-SCNC: 147 MMOL/L (ref 136–145)
TSH SERPL DL<=0.05 MIU/L-ACNC: 2.16 UIU/ML (ref 0.36–3.74)
VIT B12 SERPL-MCNC: 713 PG/ML (ref 193–986)
WBC # BLD AUTO: 15.9 K/UL (ref 4.1–11.1)

## 2021-02-05 PROCEDURE — 51798 US URINE CAPACITY MEASURE: CPT

## 2021-02-05 PROCEDURE — 84443 ASSAY THYROID STIM HORMONE: CPT

## 2021-02-05 PROCEDURE — 85025 COMPLETE CBC W/AUTO DIFF WBC: CPT

## 2021-02-05 PROCEDURE — 36415 COLL VENOUS BLD VENIPUNCTURE: CPT

## 2021-02-05 PROCEDURE — 74011000258 HC RX REV CODE- 258: Performed by: ORTHOPAEDIC SURGERY

## 2021-02-05 PROCEDURE — 74011250637 HC RX REV CODE- 250/637: Performed by: ORTHOPAEDIC SURGERY

## 2021-02-05 PROCEDURE — 82607 VITAMIN B-12: CPT

## 2021-02-05 PROCEDURE — 74011250636 HC RX REV CODE- 250/636: Performed by: INTERNAL MEDICINE

## 2021-02-05 PROCEDURE — 74011250636 HC RX REV CODE- 250/636: Performed by: ORTHOPAEDIC SURGERY

## 2021-02-05 PROCEDURE — 71045 X-RAY EXAM CHEST 1 VIEW: CPT

## 2021-02-05 PROCEDURE — 74011250637 HC RX REV CODE- 250/637: Performed by: INTERNAL MEDICINE

## 2021-02-05 PROCEDURE — 92526 ORAL FUNCTION THERAPY: CPT

## 2021-02-05 PROCEDURE — 80053 COMPREHEN METABOLIC PANEL: CPT

## 2021-02-05 PROCEDURE — 94640 AIRWAY INHALATION TREATMENT: CPT

## 2021-02-05 PROCEDURE — 74011000250 HC RX REV CODE- 250: Performed by: INTERNAL MEDICINE

## 2021-02-05 RX ORDER — SAME BUTANEDISULFONATE/BETAINE 400-600 MG
250 POWDER IN PACKET (EA) ORAL 2 TIMES DAILY
Qty: 14 CAP | Refills: 0 | Status: SHIPPED
Start: 2021-02-05 | End: 2021-02-12

## 2021-02-05 RX ORDER — ALBUTEROL SULFATE 2.5 MG/.5ML
2.5 SOLUTION RESPIRATORY (INHALATION)
Qty: 20 ML | Refills: 0 | Status: SHIPPED
Start: 2021-02-05

## 2021-02-05 RX ORDER — FERROUS SULFATE, DRIED 160(50) MG
1 TABLET, EXTENDED RELEASE ORAL
Qty: 90 TAB | Refills: 0 | Status: SHIPPED
Start: 2021-02-05

## 2021-02-05 RX ORDER — DEXTROSE, SODIUM CHLORIDE, AND POTASSIUM CHLORIDE 5; .45; .15 G/100ML; G/100ML; G/100ML
75 INJECTION INTRAVENOUS CONTINUOUS
Status: DISCONTINUED | OUTPATIENT
Start: 2021-02-05 | End: 2021-02-05

## 2021-02-05 RX ORDER — AMLODIPINE BESYLATE 2.5 MG/1
2.5 TABLET ORAL DAILY
Qty: 30 TAB | Refills: 0 | Status: SHIPPED
Start: 2021-02-06

## 2021-02-05 RX ORDER — AMOXICILLIN AND CLAVULANATE POTASSIUM 875; 125 MG/1; MG/1
1 TABLET, FILM COATED ORAL 2 TIMES DAILY
Qty: 10 TAB | Refills: 0 | Status: SHIPPED
Start: 2021-02-05 | End: 2021-02-10

## 2021-02-05 RX ORDER — AMLODIPINE BESYLATE 5 MG/1
2.5 TABLET ORAL DAILY
Status: DISCONTINUED | OUTPATIENT
Start: 2021-02-05 | End: 2021-02-05 | Stop reason: HOSPADM

## 2021-02-05 RX ORDER — ENOXAPARIN SODIUM 100 MG/ML
30 INJECTION SUBCUTANEOUS DAILY
Qty: 4.2 ML | Refills: 0 | Status: SHIPPED
Start: 2021-02-06 | End: 2021-02-20

## 2021-02-05 RX ORDER — AMOXICILLIN 250 MG
1 CAPSULE ORAL 2 TIMES DAILY
Qty: 60 TAB | Refills: 0 | Status: SHIPPED
Start: 2021-02-05

## 2021-02-05 RX ORDER — ALBUTEROL SULFATE 0.83 MG/ML
2.5 SOLUTION RESPIRATORY (INHALATION)
Status: COMPLETED | OUTPATIENT
Start: 2021-02-05 | End: 2021-02-05

## 2021-02-05 RX ORDER — ACETAMINOPHEN 325 MG/1
650 TABLET ORAL
Qty: 30 TAB | Refills: 0 | Status: SHIPPED
Start: 2021-02-05

## 2021-02-05 RX ORDER — POLYETHYLENE GLYCOL 3350 17 G/17G
17 POWDER, FOR SOLUTION ORAL
Qty: 14 PACKET | Refills: 0 | Status: SHIPPED
Start: 2021-02-05

## 2021-02-05 RX ADMIN — CALCIUM CARBONATE-VITAMIN D TAB 500 MG-200 UNIT 1 TABLET: 500-200 TAB at 11:09

## 2021-02-05 RX ADMIN — POTASSIUM CHLORIDE, DEXTROSE MONOHYDRATE AND SODIUM CHLORIDE 75 ML/HR: 150; 5; 450 INJECTION, SOLUTION INTRAVENOUS at 10:53

## 2021-02-05 RX ADMIN — ALBUTEROL SULFATE 2.5 MG: 2.5 SOLUTION RESPIRATORY (INHALATION) at 14:51

## 2021-02-05 RX ADMIN — PIPERACILLIN AND TAZOBACTAM 3.38 G: 3; .375 INJECTION, POWDER, LYOPHILIZED, FOR SOLUTION INTRAVENOUS at 06:21

## 2021-02-05 RX ADMIN — POLYETHYLENE GLYCOL 3350 17 G: 17 POWDER, FOR SOLUTION ORAL at 08:38

## 2021-02-05 RX ADMIN — PIPERACILLIN AND TAZOBACTAM 3.38 G: 3; .375 INJECTION, POWDER, LYOPHILIZED, FOR SOLUTION INTRAVENOUS at 14:06

## 2021-02-05 RX ADMIN — ENOXAPARIN SODIUM 30 MG: 30 INJECTION SUBCUTANEOUS at 08:38

## 2021-02-05 RX ADMIN — CALCIUM CARBONATE-VITAMIN D TAB 500 MG-200 UNIT 1 TABLET: 500-200 TAB at 17:23

## 2021-02-05 RX ADMIN — CALCIUM CARBONATE-VITAMIN D TAB 500 MG-200 UNIT 1 TABLET: 500-200 TAB at 08:38

## 2021-02-05 RX ADMIN — AMLODIPINE BESYLATE 2.5 MG: 5 TABLET ORAL at 11:06

## 2021-02-05 RX ADMIN — POTASSIUM BICARBONATE 40 MEQ: 782 TABLET, EFFERVESCENT ORAL at 10:54

## 2021-02-05 RX ADMIN — DOCUSATE SODIUM 50 MG AND SENNOSIDES 8.6 MG 1 TABLET: 8.6; 5 TABLET, FILM COATED ORAL at 08:38

## 2021-02-05 RX ADMIN — Medication: at 14:35

## 2021-02-05 RX ADMIN — Medication: at 06:21

## 2021-02-05 NOTE — PROGRESS NOTES
TRANSFER - OUT REPORT:    Verbal report given to Ahmet Burden RN(name) on Montse Taylor  being transferred to [facility] for routine progression of care       Report consisted of patients Situation, Background, Assessment and   Recommendations(SBAR). Information from the following report(s) SBAR, Kardex, Intake/Output and MAR was reviewed with the receiving nurse. Opportunity for questions and clarification was provided.       Patient transported with:   TANISHA

## 2021-02-05 NOTE — PROGRESS NOTES
[address] [facility] Adult  Hospitalist Group                                                                                                                                                  Hospitalist Progress Note  [address] [facility], DO  Answering service: [phone] from in house phone         Date of Service:  2/3/2021  NAME:  Mei Edmonds  :  10/29/1921  MRN:  [id number]        Admission Summary:   80 y. o. male who presents with fall. Patient son reported that patient lives at bedside along assisted living facility. Jovon Lorenzo has had a stroke and has right-sided weakness associated with the same.  Patient is extremely hard of hearing and without his hearing aid cannot hear much. Hardik Villatoro reports that he has not been able to see his dad since 2020 and even at that time he appeared to be somewhat confused and he suspects that the patient is starting to have some dementia. Hardik Villatoro reports that patient is extremely stubborn and still gets up and does his chores and has had falls in the past. Jovon Lorenzo apparently came to the ER complaining of right hip pain, when I went to evaluate the patient he was not able to provide much history but does appear to be in pain and discomfort and is pointing to his right leg.  Patient son reports that patient has had weakness on the right side due to the stroke and also reports that he is a DNR.  No further history could be obtained from the patient. Jovon Lorenzo was found to have a right femoral fracture and was requested to be admitted to the hospitalist service.     Interval history / Subjective:   Patient evaluated this morning, resting in bed, NAD.  States he is only in pain when moving, o/w not in pain.      Assessment & Plan:      Right femoral fracture:   History of recurrent falls:  S/p right hip hemiarthroplasty on 2/3/2020  -pain control  -DVT prophylaxis per ortho Lovenox 30mg daily in hospital; SCDs  - PT/OT - WBAT  - Pain - Tylenol anjali, Oxycodone PRN; Ice, Elevation, Ace wrap as needed  - Dressing - Leave in place if it remains dry and intact; change as needed for saturation with dry sterile island dressing  - Dispo Pending PT/OT recs; needs f/u in 3 weeks with Dr. 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  -statin  -mech soft diet per SLP    Dehydration/volume depletion/prerenal azotemia/concentrated urine  Cont mIVF    Hypokalemia  Replete, check mag in am     Metabolic encephalopathy/suspected dementia  -secondary to above, monitor     Underweight, protein calorie malnutrition  -nutrition consult      HTN, not on any meds, slightly above goal  Add low dose norvasc  monitor     Code status: DNR  DVT prophylaxis: lovenox     Care Plan discussed with: pt  Anticipated Disposition: SNF/LTC  Anticipated Discharge: 24-48h                  Hospital Problems  Never Reviewed           Codes Class Noted POA     Hip fracture ([facility] [address] ICD-10-CM: S72.009A  ICD-9-CM: 820.8   2/2/2021 Unknown                      Review of Systems:   Unable to obtain        Vital Signs:    Last 24hrs VS reviewed since prior progress note.  Most recent are:  Visit Vitals  Patient Vitals for the past 12 hrs:   Temp Pulse Resp BP SpO2   02/05/21 0841 97.7 °F (36.5 °C) 80 16 (!) 157/77 93 %   02/05/21 0613 97.8 °F (36.6 °C) 83 18 (!) 164/83 94 %      Physical Examination:      I had a face to face encounter with this patient and independently examined them today                                                       Constitutional:  No acute distress, frail, cachetic    ENT:  Oral mucosa moist, oropharynx benign   Resp:  decreased effort, grossly CTAB   CV:  RRR, no audible murmur    GI:  Soft, non distended, nontender    Musculoskeletal:  No edema, warm Neurologic:  right sided deficits at baseline, awake, alert                        Data Review:    Review and/or order of clinical lab test  Review and/or order of tests in the radiology section of CPT  Review and/or order of tests in the medicine section of CPT      Labs:     Recent Results (from the past 12 hour(s))   CBC WITH AUTOMATED DIFF    Collection Time: 02/05/21  6:35 AM   Result Value Ref Range    WBC 15.9 (H) 4.1 - 11.1 K/uL    RBC 4.15 4.10 - 5.70 M/uL    HGB 11.5 (L) 12.1 - 17.0 g/dL    HCT 37.1 36.6 - 50.3 %    MCV 89.4 80.0 - 99.0 FL    MCH 27.7 26.0 - 34.0 PG    MCHC 31.0 30.0 - 36.5 g/dL    RDW 16.2 (H) 11.5 - 14.5 %    PLATELET 542 746 - 513 K/uL    MPV 10.1 8.9 - 12.9 FL    NRBC 0.0 0  WBC    ABSOLUTE NRBC 0.00 0.00 - 0.01 K/uL    NEUTROPHILS 86 (H) 32 - 75 %    LYMPHOCYTES 5 (L) 12 - 49 %    MONOCYTES 9 5 - 13 %    EOSINOPHILS 0 0 - 7 %    BASOPHILS 0 0 - 1 %    IMMATURE GRANULOCYTES 0 0.0 - 0.5 %    ABS. NEUTROPHILS 13.5 (H) 1.8 - 8.0 K/UL    ABS. LYMPHOCYTES 0.9 0.8 - 3.5 K/UL    ABS. MONOCYTES 1.4 (H) 0.0 - 1.0 K/UL    ABS. EOSINOPHILS 0.1 0.0 - 0.4 K/UL    ABS. BASOPHILS 0.0 0.0 - 0.1 K/UL    ABS. IMM. GRANS. 0.1 (H) 0.00 - 0.04 K/UL    DF AUTOMATED     METABOLIC PANEL, COMPREHENSIVE    Collection Time: 02/05/21  6:35 AM   Result Value Ref Range    Sodium 147 (H) 136 - 145 mmol/L    Potassium 3.2 (L) 3.5 - 5.1 mmol/L    Chloride 115 (H) 97 - 108 mmol/L    CO2 26 21 - 32 mmol/L    Anion gap 6 5 - 15 mmol/L    Glucose 105 (H) 65 - 100 mg/dL    BUN 19 6 - 20 MG/DL    Creatinine 0.33 (L) 0.70 - 1.30 MG/DL    BUN/Creatinine ratio 58 (H) 12 - 20      GFR est AA >60 >60 ml/min/1.73m2    GFR est non-AA >60 >60 ml/min/1.73m2    Calcium 8.0 (L) 8.5 - 10.1 MG/DL    Bilirubin, total 0.5 0.2 - 1.0 MG/DL    ALT (SGPT) 16 12 - 78 U/L    AST (SGOT) 28 15 - 37 U/L    Alk.  phosphatase 73 45 - 117 U/L    Protein, total 5.1 (L) 6.4 - 8.2 g/dL    Albumin 1.6 (L) 3.5 - 5.0 g/dL    Globulin 3.5 2.0 - 4.0 g/dL    A-G Ratio 0.5 (L) 1.1 - 2.2        Medications Reviewed:      Current Facility-Administered Medications   Medication Dose Route Frequency Provider Last Rate Last Admin    dextrose 5% - 0.45% NaCl with KCl 20 mEq/L infusion  75 mL/hr IntraVENous CONTINUOUS Dejan Rivas MD        amLODIPine (NORVASC) tablet 2.5 mg  2.5 mg Oral DAILY Dejan Rivas MD        potassium bicarb-citric acid (EFFER-K) tablet 40 mEq  40 mEq Oral NOW Dejan Rivas MD        balsam peru-castor oiL (VENELEX) ointment   Topical Q8H Vivienne Rosenberg MD   Given at 02/05/21 6440    sodium chloride (NS) flush 5-40 mL  5-40 mL IntraVENous Q8H Vivienne Rosenberg MD   10 mL at 02/03/21 0647    sodium chloride (NS) flush 5-40 mL  5-40 mL IntraVENous PRN [place], DO        acetaminophen (TYLENOL) tablet 650 mg  650 mg Oral Q4H PRN Verneita Starch C, DO        piperacillin-tazobactam (ZOSYN) 3.375 g in 0.9% sodium chloride (MBP/ADV) 100 mL MBP  3.375 g IntraVENous Q8H Diggs, Brandon C, DO 25 mL/hr at 02/05/21 0621 3.375 g at 02/05/21 4326    atorvastatin (LIPITOR) tablet 20 mg  20 mg Oral QHS Diggs, Brandon C, DO   20 mg at 02/04/21 2242    sodium chloride (NS) flush 5-40 mL  5-40 mL IntraVENous Q8H Diggs, Brandon C, DO        sodium chloride (NS) flush 5-40 mL  5-40 mL IntraVENous PRN Verneita Starch C, DO        acetaminophen (TYLENOL) tablet 650 mg  650 mg Oral Q6H Diggs, Brandon C, DO   650 mg at 02/04/21 1736    oxyCODONE IR (ROXICODONE) tablet 2.5 mg  2.5 mg Oral Q4H PRN Verneita Starch C, DO        oxyCODONE IR (ROXICODONE) tablet 5 mg  5 mg Oral Q4H PRN Verneita Starch C, DO        naloxone [facility]) injection 0.4 mg  0.4 mg IntraVENous PRN Hipolito Min, Brandon C, DO        ondansetron (ZOFRAN ODT) tablet 4 mg  4 mg Oral Q4H PRN Brandon Messer, DO        calcium-vitamin D (OS-SORIN +D3) 500 mg-200 unit per tablet 1 Tab  1 Tab Oral TID WITH MEALS Brandon Diggs, DO   1 Tab at 02/05/21 0838    senna-docusate (PERICOLACE) 8.6-50 mg per tablet 1 Tab 1 Tab Oral BID Noman Childs C, DO   1 Tab at 02/05/21 8764    polyethylene glycol (MIRALAX) packet 17 g  17 g Oral DAILY Noman Childs C, DO   17 g at 02/05/21 1551    bisacodyL (DULCOLAX) suppository 10 mg  10 mg Rectal DAILY PRN Virginia Sarah, DO        hydrOXYzine HCL (ATARAX) tablet 10 mg  10 mg Oral Q8H PRN Noman Childs C, DO        enoxaparin (LOVENOX) injection 30 mg  30 mg SubCUTAneous DAILY Brandon Diggs C, DO   30 mg at 02/05/21 6750    morphine injection 1 mg  1 mg IntraVENous Q4H PRN Noman Childs C, DO   1 mg at 02/03/21 0148   ______________________________________________________________________  EXPECTED LENGTH OF STAY: 4d 4h  ACTUAL LENGTH OF STAY:          1

## 2021-02-05 NOTE — PROGRESS NOTES
Patient's oxygen saturation 85% on 5L nasal cannula. Patient sat up in bed, incentive spirometer used. MD paged. MD ordering breathing treatment and chest X Ray.     1500: Breathing treatment completed, patient oxygenating 100% on 5L NC.

## 2021-02-05 NOTE — PROGRESS NOTES
Physician Progress Note      PATIENT:               Giorgio Kirkland  CSN #:                  [id number]  :                       10/29/1921  ADMIT DATE:       2021 2:35 PM  [address] [place] DATE:  RESPONDING  PROVIDER #:        Roylene Hamman MD          QUERY TEXT:    Pt admitted with fracture   Pt noted to have documentation of pneumonia  . If possible, please document in the progress notes and discharge summary if you are evaluating and/or treating any of the following: The medical record reflects the following:  Risk Factors: old CVA, pneumonia  Clinical Indicators:  2/3 PN  Concern for left lower lobe pneumonia with leukocytosis    Treatment: Zosyn  Thank you,  Fina Singletary RN, CDI, CCDS  Certified Clinical   [phone]  Options provided:  -- Gram negative pneumonia  -- Bacterial pneumonia  -- Aspiration pneumonia  -- Other - I will add my own diagnosis  -- Disagree - Not applicable / Not valid  -- Disagree - Clinically unable to determine / Unknown  -- Refer to Clinical Documentation Reviewer    PROVIDER RESPONSE TEXT:    This patient has bacterial pneumonia. Query created by: Debra Rossi on 2021 11:09 AM      QUERY TEXT:    Pt admitted with fracture . Noted documentation of severe protein calorie malnutrition by nutritional consultant.   If possible, please document in progress notes and discharge summary:    The medical record reflects the following:  Risk Factors: weight loss  Clinical Indicators:  2/3-nutritional consult  Malnutrition Assessment:  Malnutrition Status:  Severe malnutrition  Context:  Chronic illness  Findings of the 6 clinical characteristics of malnutrition:  Energy Intake:  Unable to assess  Weight Loss:  Unable to assess  Body Fat Loss:  7 - Severe body fat loss,  Muscle Mass Loss:  7 - Severe muscle mass loss,  Fluid Accumulation:  No significant fluid accumulation,   Strength:  Not performed        Treatment: nutritional consult, nutritional supplements    Thank you,  Angela Castro RN, CDI, CCDS  Certified Clinical   [phone]  Options provided:  -- severe protein calorie malnutrition confirmed present on admission  -- Defer to nutritional  consultant documentation regarding severe protein calorie malnutrition  -- Other - I will add my own diagnosis  -- Disagree - Not applicable / Not valid  -- Disagree - Clinically unable to determine / Unknown  -- Refer to Clinical Documentation Reviewer    PROVIDER RESPONSE TEXT:    The diagnosis of severe protein calorie malnutrition was confirmed as present on admission.     Query created by: Gómez Fajardo on 2/4/2021 11:13 AM      Electronically signed by:  Randell Eubanks MD 2/5/2021 2:54 PM

## 2021-02-05 NOTE — PROGRESS NOTES
Orthopedic Progress Note    S: Pain well controlled at rest, more alert and active today, pain with movement increased, but tolerating on current medications; no new complaints;Denies numbness, tingling, focal weakness, cp, sob, fever, chills    O: NAD, respirations unlabored; holding leg in flexion, externally rotated, able to rotate internally, but knee unable to completely extend, appears to be chronic contracture; Dressings dry and intact, small stain; thigh soft, no edema, no posterior calf ttp; DF/PF present, SILT; Cap refill brisk, foot warm, DP2+    No data found.   Recent Labs     02/05/21  0635 02/04/21  0553 02/04/21  0435   HGB 11.5* 13.0  --    HCT 37.1 41.4  --     269  --    BUN 19  --  23*   CREA 0.33*  --  0.55*   K 3.2*  --  3.7   *  --  142         A/P:  Procedure: Procedure(s):  RIGHT HIP HEMIARTHROPLASTY  Post Op day: 2 Days Post-Op    - DVT ppx - Lovenox 30mg daily in hospital; SCDs  - PT/OT - WBAT  - Pain - Tylenol anjali, Oxycodone PRN; Ice, Elevation, Ace wrap as needed  - Dressing - Leave in place if it remains dry and intact; change as needed for saturation with dry sterile island dressing  - Dispo - orthopedically stable for discharge; needs f/u in 3 weeks with Dr. Jennifer Hough; refer to DC instructions for further details.     TUNDE Rice  Orthopedic Trauma Service  [facility]

## 2021-02-05 NOTE — PROGRESS NOTES
KATHY- D/c to [facility].    CM received a message from [facility] that they cannot take this pt until 6:15-6:30pm. CM spoke with  who encouraged CM to set this transport up with an alternate provider. CM called Noel with [facility] and he can provide transportation for this pt between 5:30 and 6pm. CM asked him to transport this pt. CM cancelled the [facility] trip in [employer]. CM called Pat in admissions to give her the ETA. CM also called pt's son, Drake, to give him the ETA. An envelope containing pt's kardex, MARs, and AVS will be sent with this pt to [facility]. Also, pt's nurse will call report. MELANI Hauser,ACM-SW

## 2021-02-05 NOTE — PROGRESS NOTES
Problem: Dysphagia (Adult)  Goal: *Acute Goals and Plan of Care (Insert Text)  Description: Initiated 2/4/2021  1. Patient will tolerate full liquid diet free of s/s of aspiration within 7 days. 2. Patient will tolerate solid trials free of difficulty within 7 days. Outcome: Resolved/Met     SPEECH LANGUAGE PATHOLOGY DYSPHAGIA TREATMENT/DISCHARGE  Patient: Brandy Gamez (89 y.o. male)  Date: 2/5/2021  Diagnosis: Hip fracture ([facility] [address] [S72.009A] <principal problem not specified>  Procedure(s) (LRB):  RIGHT HIP HEMIARTHROPLASTY (Right) 2 Days Post-Op  Precautions:  Fall, WBAT    ASSESSMENT:  Pt reportedly has been tolerating diet without any significant difficulty nor significant overt s/s of aspiration. Pt is 80years old which means that he likely has presbyphagia and presbyesophagus which means that pt will have intermittent aspiration and likely esophageal dysmotility, both of which are considered normal swallow variants above age 80. Given this and given that pt has had imaging and recommended to initiate mechanical soft diet/thin liquids from that imaging, would reinitiate baseline diet. PLAN:  --Mechanical soft diet/thin liquids  --general aspiration precautions  --1:1 assistance  --pt may have intermittent coughing  --small sips/bites  --alternate liquids/solids  --hold with fatigue     Patient will be discharged from acute skilled speech therapy at this time. Rationale for discharge:  Goals achieved    Discharge Recommendations:  None     SUBJECTIVE:   Patient stated, \"I'm a bad patient. Pt perseverating on this during session. OBJECTIVE:   Cognitive and Communication Status:  Neurologic State: Alert, Confused  Orientation Level: Oriented to person, Disoriented to time, Disoriented to situation  Cognition: Follows commands    Perception: Appears intact    Perseveration: Perseverates during conversation       Dysphagia Treatment:    P.O.  Trials:  Patient Position: upright in bed  Vocal quality prior to P.O.: Hoarse  Consistency Presented: Thin liquid;Mechanical soft  How Presented: SLP-fed/presented;Cup/sip;Spoon;Straw     Bolus Acceptance: No impairment  Bolus Formation/Control: Impaired  Type of Impairment: Delayed  Propulsion: Delayed (# of seconds)  Oral Residue: None  Initiation of Swallow: No impairment  Laryngeal Elevation: Functional  Aspiration Signs/Symptoms: (delayed cough intermittently)  Pharyngeal Phase Characteristics: Easily fatigued            Oral Phase Severity: No impairment  Pharyngeal Phase Severity : Other (comment)(presbyphagia?)    NOMS:   The NOMS functional outcome measure was used to quantify this patient's level of swallowing impairment. Based on the NOMS, the patient was determined to be at level 6 for swallow function     NOMS Swallowing Levels:  Level 1 (CN): NPO  Level 2 (CM): NPO but takes consistency in therapy  Level 3 (CL): Takes less than 50% of nutrition p.o. and continues with nonoral feedings; and/or safe with mod cues; and/or max diet restriction  Level 4 (CK): Safe swallow but needs mod cues; and/or mod diet restriction; and/or still requires some nonoral feeding/supplements  Level 5 (CJ): Safe swallow with min diet restriction; and/or needs min cues  Level 6 (CI): Independent with p.o.; rare cues; usually self cues; may need to avoid some foods or needs extra time  Level 7 ([address] [place] [address]): Independent for all p.o.  GUSTAVO. (2003). National Outcomes Measurement System (NOMS): Adult Speech-Language Pathology User's Guide. Pain:  Pain Scale 1: Adult Nonverbal Pain Scale          After treatment:   Call bell within reach and Nursing notified    COMMUNICATION/EDUCATION:     The patient's plan of care including recommendations, planned interventions, and recommended diet changes were discussed with: Physical therapist and Registered nurse, PCT.      Lauretha Lesches, SLP  Time Calculation: 10 mins

## 2021-02-05 NOTE — PROGRESS NOTES
INFECTIOUS DISEASES CONSULT: See Dictation      IMPRESSION:    1. Leukocytosis -- stress reaction to fracture + surgery vs infection: The parenchymal changes at the left base on the portable CXR and the chest CT raise suspicion of pneumonia -- aspiration? 2. Parenchymal disease at both lung bases with bibasilar parenchymal disease (L>R), bronchiectasis, mildly increased interstitial markings: Some of theses changes ricardo likely chronic but pneumonia at left base is possible. 3. Past history of left hemispheric CVA -- right hemiparesis and possible partial expressive aphasia    4. Advanced bilateral hearing loss      PLAN:    1. Watch on Alka Trammell M.D.

## 2021-02-05 NOTE — PROGRESS NOTES
CM verified patient has a qualifying hospital stay for [facility].   Mary Carmen Cormier, BSW/CRM

## 2021-02-05 NOTE — PROGRESS NOTES
SLP Contact Note    SLP treatment complete. Pt with delayed cough intermittently noted. However, given pt age, would anticipate a presbyphagia and/or esophageal dysphagia as culprit over a true prandial aspiration. Therefore, would recommend initiating baseline diet of [facility] soft diet/thin liquids. Some aspiration is considered normal for those above age 80. Full note to follow.     Thank you,  Lauretha Lesches, M.Ed, [address] [facility]  Speech-Language Pathologist

## 2021-02-05 NOTE — PROGRESS NOTES
[facility]  Hospitalist Group                                                                                                                                                  Hospitalist Progress Note  Kristina Larose DO  Answering service: [phone] from in house phone         Date of Service:  2/3/2021  NAME:  Dirk Henson  :  10/29/1921  MRN:  [id number]        Admission Summary:   80 y. o. male who presents with fall.  Patient son reported that patient lives at bedside along assisted living facility. Unique Vega has had a stroke and has right-sided weakness associated with the same.  Patient is extremely hard of hearing and without his hearing aid cannot hear much. Ebonie Frankel reports that he has not been able to see his dad since 2020 and even at that time he appeared to be somewhat confused and he suspects that the patient is starting to have some dementia. Ebonie Frankel reports that patient is extremely stubborn and still gets up and does his chores and has had falls in the past. Unique Vega apparently came to the ER complaining of right hip pain, when I went to evaluate the patient he was not able to provide much history but does appear to be in pain and discomfort and is pointing to his right leg.  Patient son reports that patient has had weakness on the right side due to the stroke and also reports that he is a DNR.  No further history could be obtained from the patient. Unique Vega was found to have a right femoral fracture and was requested to be admitted to the hospitalist service.     Interval history / Subjective:   Patient evaluated this morning, resting in bed, extremely hard of hearing, does not have his hearing aids, very hard to communicate, does not appear to be in acute distress      Assessment & Plan:      Right femoral fracture:   History of recurrent falls:  S/p right hip hemiarthroplasty on 2/3/2020  -pain control  -DVT prophylaxis per ortho     Concern for left lower lobe pneumonia with leukocytosis:  -on broad spectrum zosyn- recommend deescalating with clinical improvement   -Rapid COVID negative, PCR sent     Leukocytosis  Persistent, could be secondary to pneumonia, blood cultures negative thus far, will get ID consult, monitor, patient on broad-spectrum IV antibiotics     Dehydration:   -IVFs     Metabolic encephalopathy:  -secondary to above, monitor for improvement     Suspected dementia:  -Supportive care     History of stroke with chronic right-sided deficits:  -hold statin      BMI 12.4: nutrition consult         Code status: DNR  DVT prophylaxis: Heparin     Care Plan discussed with: Nurse  Anticipated Disposition: SNF/LTC  Anticipated Discharge: Greater than 48 hours                  Hospital Problems  Never Reviewed           Codes Class Noted POA     Hip fracture ([facility] [address] ICD-10-CM: S72.009A  ICD-9-CM: 820.8   2/2/2021 Unknown                      Review of Systems:   Unable to obtain        Vital Signs:    Last 24hrs VS reviewed since prior progress note. Most recent are:  Visit Vitals  /77 (BP 1 Location: Right upper arm, BP Patient Position: At rest)   Pulse 89   Temp 98.2 °F (36.8 °C)   Resp 18   Ht 5' 8\" (1.727 m)   Wt 37 kg (81 lb 9.1 oz)   SpO2 92%   BMI 12.40 kg/m²         No intake or output data in the 24 hours ending 02/03/21 1228      Physical Examination:      I had a face to face encounter with this patient and independently examined them on 2/3/2021 as outlined below:                                                       Constitutional:  No acute distress, frail, cachetic    ENT:  Oral mucosa moist, oropharynx benign.     Resp:  Scattered coarse breath sounds bilateral lung bases   CV:  Regular rhythm, normal rate, no murmurs, gallops, rubs    GI:  Soft, non distended, non tender    Musculoskeletal:  No edema, warm, 2+ pulses throughout    Neurologic:  right sided deficits                         Data Review:    Review and/or order of clinical lab test  Review and/or order of tests in the radiology section of CPT  Review and/or order of tests in the medicine section of CPT        Labs:           Recent Labs     02/03/21  0213 02/02/21  1538   WBC 21.1* 14.2*   HGB 13.8 14.0   HCT 44.8 43.6    262           Recent Labs     02/03/21  0213 02/02/21  1538    138   K 4.1 4.2   * 107   CO2 26 28   BUN 23* 27*   CREA 0.58* 0.63*   GLU 68 90   CA 8.5 8.6           Recent Labs     02/03/21  0213 02/02/21  1538   ALT 23 25    100   TBILI 0.5 0.6   TP 6.4 6.4   ALB 1.7* 1.8*   GLOB 4.7* 4.6*          Recent Labs     02/02/21  1538   INR 1.0   PTP 10.9   APTT 24.5      No results for input(s): FE, TIBC, PSAT, FERR in the last 72 hours. No results found for: FOL, RBCF   No results for input(s): PH, PCO2, PO2 in the last 72 hours.   No results for input(s): CPK, CKNDX, TROIQ in the last 72 hours.     No lab exists for component: CPKMB  No results found for: CHOL, CHOLX, CHLST, CHOLV, HDL, HDLP, LDL, LDLC, DLDLP, TGLX, TRIGL, TRIGP, CHHD, CHHDX  No results found for: GLUCPOC  No results found for: COLOR, APPRN, SPGRU, REFSG, ALVIN, PROTU, GLUCU, KETU, BILU, UROU, ADWOA, LEUKU, GLUKE, EPSU, BACTU, WBCU, RBCU, CASTS, UCRY        Medications Reviewed:             Current Facility-Administered Medications   Medication Dose Route Frequency    sodium chloride (NS) flush 5-40 mL  5-40 mL IntraVENous Q8H    sodium chloride (NS) flush 5-40 mL  5-40 mL IntraVENous PRN    0.9% sodium chloride infusion  75 mL/hr IntraVENous CONTINUOUS    acetaminophen (TYLENOL) tablet 650 mg  650 mg Oral Q4H PRN    heparin (porcine) injection 5,000 Units  5,000 Units SubCUTAneous Q8H    piperacillin-tazobactam (ZOSYN) 3.375 g in 0.9% sodium chloride (MBP/ADV) 100 mL MBP  3.375 g IntraVENous Q8H    morphine injection 1 mg  1 mg IntraVENous Q4H PRN      ______________________________________________________________________  EXPECTED LENGTH OF STAY: 4d 4h  ACTUAL LENGTH OF STAY:          1

## 2021-02-05 NOTE — DISCHARGE SUMMARY
Discharge Summary     PATIENT ID: Burrell Closs  MRN: [id number]   YOB: 1921    DATE OF ADMISSION: 2/2/2021  2:35 PM    DATE OF DISCHARGE: 2/5/2021  PRIMARY CARE PROVIDER: Emerita Frederick   DISCHARGING PROVIDER: Gabe Aquino MD    To contact this individual call [phone] and ask the  to page. If unavailable ask to be transferred the Adult Hospitalist Department. CONSULTATIONS: IP CONSULT TO ORTHOPEDIC SURGERY  IP CONSULT TO INFECTIOUS DISEASES    PROCEDURES/SURGERIES: Procedure(s):  RIGHT HIP HEMIARTHROPLASTY    ADMITTING [facility] COURSE:   80 y. o. male who presents with fall. Patient son reported that patient lives at bedside along assisted living facility. Manish Bennett has had a stroke and has right-sided weakness associated with the same.  Patient is extremely hard of hearing and without his hearing aid cannot hear much. [facility] reports that he has not been able to see his dad since December 2020 and even at that time he appeared to be somewhat confused and he suspects that the patient is starting to have some dementia. [facility] reports that patient is extremely stubborn and still gets up and does his chores and has had falls in the past. Manish Bennett apparently came to the ER complaining of right hip pain, when I went to evaluate the patient he was not able to provide much history but does appear to be in pain and discomfort and is pointing to his right leg.  Patient son reports that patient has had weakness on the right side due to the stroke and also reports that he is a DNR.  No further history could be obtained from the patient. Manish Bennett was found to have a right femoral fracture and was requested to be admitted to the hospitalist service.     DISCHARGE DIAGNOSES / PLAN:      Right femoral fracture:   History of recurrent falls:  S/p right hip hemiarthroplasty on 2/3/2020  -pain control  -DVT prophylaxis per ortho Lovenox 30mg daily in hospital; SCDs  - PT/OT - WBAT  - Pain - Tylenol, Ice, Elevation, Ace wrap as needed  - Dressing - Leave in place if it remains dry and intact; change as needed for saturation with dry sterile island dressing  - Dispo SNF, needs f/u in 3 weeks with Dr. Kapil Mclaughlin     Concern for left lower lobe pneumonia with leukocytosis, vs reactive, improving  -appreciate ID assistance  -cont to monitor on zosyn, switch to Augmentin at d/c  -Rapid COVID negative, PCR negative  Blood cxs no growth     H/o left hemispheric CVA -- right hemiparesis and possible partial expressive aphasia  -statin  -mech soft diet per SLP     Dehydration/volume depletion/prerenal azotemia/concentrated urine  Cont mIVF     Hypokalemia  Replete     Metabolic encephalopathy/suspected dementia, stable  -secondary to above, monitor     Underweight, protein calorie malnutrition  -ensure tid     HTN, not on any meds, slightly above goal  Add low dose norvasc  Monitor    HFpEF LVEF 63%, clinically compensated       ADDITIONAL CARE RECOMMENDATIONS: f/u with PCP 3-5 days for CBC, BMP, BP; f/u in 3 weeks with Dr. Kapil Mclaughlin    PENDING TEST RESULTS:   At the time of discharge the following test results are still pending: none    Patient Instructions     FOLLOW UP APPOINTMENTS:    Follow-up Information     Follow up With Specialties Details Why Contact Info    UNKNOWN        Angeles Maldonado NP Nurse Practitioner   [address] [facility] [address]  [phone]             DIET: mech soft, ensure with meals    ACTIVITY: Activity as tolerated    WOUND CARE: Dressing - Leave in place if it remains dry and intact; change as needed for saturation with dry sterile island dressing    NOTIFY YOUR PHYSICIAN FOR ANY OF THE FOLLOWING:   Fever over 101 degrees for 24 hours. Chest pain, shortness of breath, fever, chills, nausea, vomiting, diarrhea, change in mentation, falling, weakness, bleeding. Severe pain or pain not relieved by medications.   Or, any other signs or symptoms that you may have questions about. DISPOSITION:    Home With:   OT  PT  HH  RN      x Long term SNF/Inpatient Rehab -- [facility]    Independent/assisted living    Hospice    Other:       PATIENT CONDITION AT DISCHARGE:   Functional status   x Poor     Deconditioned     Independent      Cognition     Lucid    x Forgetful     Dementia      Catheters/lines (plus indication)    Galarza     PICC     PEG    x None      Code status     Full code    x DNR      PHYSICAL EXAMINATION AT DISCHARGE:  Constitutional:  No acute distress, frail, cachetic    ENT:  Oral mucosa moist, oropharynx benign   Resp:  decreased effort, grossly CTAB   CV:  RRR, no audible murmur    GI:  Soft, non distended, nontender    Musculoskeletal:  No edema, warm    Neurologic:  right sided deficits at baseline, awake, alert       CHRONIC MEDICAL DIAGNOSES:  Problem List as of 2/5/2021 Date Reviewed: 2/3/2021          Codes Class Noted - Resolved    Hip fracture ([facility] [address] ICD-10-CM: F58.085S  ICD-9-CM: 820.8  2/2/2021 - Present              DISCHARGE MEDICATIONS:  Current Discharge Medication List      START taking these medications    Details   acetaminophen (TYLENOL) 325 mg tablet Take 2 Tabs by mouth every four (4) hours as needed for Pain or Fever. Qty: 30 Tab, Refills: 0      amLODIPine (NORVASC) 2.5 mg tablet Take 1 Tab by mouth daily. Qty: 30 Tab, Refills: 0      calcium-vitamin D (OS-SORIN +D3) 500 mg-200 unit per tablet Take 1 Tab by mouth three (3) times daily (with meals). Qty: 90 Tab, Refills: 0      enoxaparin (LOVENOX) 30 mg/0.3 mL injection 0.3 mL by SubCUTAneous route daily for 14 days. Qty: 4.2 mL, Refills: 0      polyethylene glycol (MIRALAX) 17 gram packet Take 1 Packet by mouth daily as needed for Constipation. Qty: 14 Packet, Refills: 0      senna-docusate (PERICOLACE) 8.6-50 mg per tablet Take 1 Tab by mouth two (2) times a day.  Hold for diarrhea  Qty: 60 Tab, Refills: 0      amoxicillin-clavulanate (Augmentin) 875-125 mg per tablet Take 1 Tab by mouth two (2) times a day for 5 days. Qty: 10 Tab, Refills: 0      Saccharomyces boulardii (FLORASTOR) 250 mg capsule Take 1 Cap by mouth two (2) times a day for 7 days. Qty: 14 Cap, Refills: 0         CONTINUE these medications which have NOT CHANGED    Details   atorvastatin (LIPITOR) 20 mg tablet Take 20 mg by mouth daily.          STOP taking these medications       scopolamine (TRANSDERM-SCOP) 1 mg over 3 days pt3d Comments:   Reason for Stopping:             Greater than 30 minutes were spent with the patient on counseling and coordination of care    Signed:   Sharyn Gonzalez MD  2/5/2021  11:42 AM

## 2021-02-05 NOTE — CONSULTS
[address]    Name:  Ashlee Burns  MR#:  [id number]  :  10/29/1921  ACCOUNT #:  [de-identified]  DATE OF SERVICE:  2021    LOCATION:  The patient is currently in room 562. Consult is requested by Dr. Cesar Rowan. CHIEF COMPLAINT:  The patient initially presented to the emergency room complaining of right hip pain. He currently is unable to provide a chief complaint. REASON FOR CONSULTATION:  Leukocytosis. The consultation was requested by Dr. Cesar Rowan. History is obtained by discussion with the patient's nurse and review of the medical records. The patient can give no medical history. HISTORY OF PRESENT ILLNESS:  This patient is a 72-year-old male who apparently resides in a local nursing home. By history, he spends most of his time in a wheelchair. The patient presented to the emergency room with right hip pain. An x-ray of the right hip on the day of admission revealed an acute foreshortened and varus angulated basicervical right femur neck fracture. The following day on 2021, the patient underwent surgery by Dr. Sofya Laureano. The patient underwent a right hip hemiarthroplasty. At the time of his admission to the hospital, the patient had a white blood cell count of 14,200 with 81% neutrophils. On 2021 before surgery, his white count had risen up to 21,100. Today, his white count is 25,400 on postop day #1 with 89% neutrophils and 3% lymphocytes. He has had no fever since admission to hospital.  We are now asked to see him for further evaluation of the leukocytosis. At present time, the patient is awake. His nurse has not noted any new problems today. The patient has been able to drink liquids and eat soft foods. He has had no vomiting and no complaints of abdominal pain. He has had no diarrhea. The patient did not have a Galarza catheter on admission to hospital.  He apparently had a Galarza catheter placed for surgery. The Galarza catheter was removed today, and he has avoided twice since then. PAST MEDICAL HISTORY:  Tobacco, unknown. Alcohol, unknown. MEDICATIONS PRIOR TO ADMISSION:  1. Atorvastatin 20 mg daily. 2.  Transdermal scopolamine patch changed every 3 days. ALLERGIES:  NONE KNOWN. ILLNESSES:  1. History of a CVA in the past with a right hemiparesis. 2.  History of hyperlipidemia. 3.  No other medical illnesses are known. SURGERY:  1. Right hip hemiarthroplasty on 02/03/2021.  2.  Rest of surgical history is unknown. SOCIAL HISTORY AND FAMILY HISTORY:  Unobtainable. REVIEW OF SYSTEMS:  Unobtainable as the patient cannot answer questions. PHYSICAL EXAMINATION:  GENERAL:  Elderly frail appearing male who is awake and lying in bed. VITAL SIGNS:  Blood pressure is 151/77, heart rate 96, respiratory rate 18. His highest temperature since admission to hospital is 99.2 degrees Fahrenheit, oxygen saturation is currently 99% on 3 L of nasal oxygen. HEENT:  Pupils are equal, round, and reactive to light; no meningismus. NODES:  No adenopathy. SKIN:  No rashes. LUNGS:  Basilar rales. CARDIOVASCULAR:  Regular rate and rhythm with I/VI systolic murmur. ABDOMEN:  Soft, nondistended, no masses or HSM, no grimacing with palpation, bowel sounds are present. EXTREMITIES:  Right hip dressed postop. No pretibial edema. NEUROLOGIC:  He does seem mildly weak on the right side though the testing is limited. His speech is very difficult to understand. He may have either a dysarthria or partial expressive aphasia. LABORATORY DATA:  CBC today reveals a hemoglobin of 13.0 and white count 25,400 and platelets 611,297. Chemistry data reveals BUN of 23 and creatinine 0.55. On admission, the BUN was 27 and creatinine 0.63. Liver function tests have been essentially normal.  Urinalysis today revealed 0-4 white cells and 5-10 red cells with no bacteria and negative nitrites.     Radiology Data:  Chest CT scan on admission revealed the parenchymal disease at the posterior aspect of both bases with associated bronchiectasis and some increased interstitial markings. Based on the bronchiectasis and increased interstitial markings, it raises the question whether the parenchymal disease is chronic or acute. CT scan of the head on admission revealed no acute findings. There was partial opacification and diffuse mucoperiosteal thickening of the sinuses. The portable chest x-ray on admission suggested possible infiltrate at the left base. IMPRESSION:  1. Leukocytosis-stress reaction to the fracture and surgery versus infection:  No definite infection is identified, although the parenchymal changes at the left base are concerning. He is likely at risk for aspiration. I agree with treating him with Zosyn. 2.  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 3.  History of cerebrovascular accident. 4.  Presumed hyperlipidemia. PLAN:  Watch out for now on Zosyn. Thank you very much for allowing us to see this patient with you.       Jamel Pabon MD      [id number]  D:  02/04/2021 21:15  T:  02/04/2021 22:47  JOB #:  [id number]  CC:  MD Filomena Albert MD

## 2021-02-05 NOTE — PROGRESS NOTES
Bedside and Verbal shift change report given to Joyce Palacios (oncoming nurse) by Heriberto Smith (offgoing nurse). Report included the following information SBAR, Kardex, Intake/Output and MAR.

## 2021-02-05 NOTE — PROGRESS NOTES
SEPIDEH spoke with Manuelito Carter in admissions at [facility] and she said that she can accept this pt today. SEPIDEH sent a perfectserve message to Dr. Manuelito Carter her. She did discharge this pt today to [facility]. SEPIDEH called pt's son, Tiffanie Devries ([phone]) to update him. He is in agreement with plan. SEPIDEH sent a referral to [facility] via web care [employer] asking for a 3pm transport.  Steve Brady

## 2021-02-05 NOTE — PROGRESS NOTES
Bedside and Verbal shift change report given to Eber Burk (oncoming nurse) by Thu Jansen RN (offgoing nurse). Report included the following information SBAR, Kardex, ED Summary, OR Summary, Procedure Summary, Intake/Output, MAR and Recent Results.

## 2021-02-07 LAB
BACTERIA SPEC CULT: NORMAL
SERVICE CMNT-IMP: NORMAL

## 2021-02-08 ENCOUNTER — PATIENT OUTREACH (OUTPATIENT)
Dept: CASE MANAGEMENT | Age: 86
End: 2021-02-08

## 2021-02-08 NOTE — NURSE NAVIGATOR
[address]  [facility]opaedic [facility]way Handoff     FROM:                                TO: Vivienne Shanks                                                      ([address] [place] [address] or Facility name)  Aleksandra Ruiz 55  [address] [place] [address]  Dept: [address]: [phone]                                      Room#:  696/25                                                       Nurse Navigator:  Amol Woods RN         SITUATION      ASAScore: ASA 3 - Patient with moderate systemic disease with functional limitations    Admitted:  2021  Hospital Day: 4      Attending Provider:  No att. providers found     Consultations:  IP CONSULT TO ORTHOPEDIC SURGERY    PCP:  UNKNOWN   None     Admitting Dx: Hip fracture ([facility] [address] [S72.009A]       Active Problems:    Hip fracture ([facility] [address] (2021)      5 Days Post-Op of   Procedure(s):  RIGHT HIP HEMIARTHROPLASTY   BY: Cr Reyes DO             ON: 2/3/2021                  Code Status: Prior             Advance Directive? Not Received (Send w/patient)     Isolation:  There are currently no Active Isolations       MDRO: No current active infections    BACKGROUND     Allergies:  No Known Allergies    No past medical history on file. No past surgical history on file. Prior to Admission Medications   Prescriptions Last Dose Informant Patient Reported? Taking?   atorvastatin (LIPITOR) 20 mg tablet   Yes Yes   Sig: Take 20 mg by mouth daily. scopolamine (TRANSDERM-SCOP) 1 mg over 3 days pt3d   Yes No   Si Patch by TransDERmal route every seventy-two (72) hours. Facility-Administered Medications: None       Vaccinations: There is no immunization history on file for this patient. ASSESSMENT   Age: 80 y.o. Gender: male        Height: Height: 5' 8\" (172.7 cm)                    Weight:Weight: 40 kg (88 lb 1.6 oz)     No data found.          Active Orders   There are no active orders of the following types: Diet. Orientation: Orientation Level: Oriented to person, Disoriented to time, Disoriented to situation, Disoriented to place    Active Lines/Drains:  (Peg Tube / Galarza / CL or S/L?):no    Urinary Status: Voiding      Last BM:       Skin Integrity: Incision (comment)             Mobility: Very limited   Weight Bearing Status: NWB (Non Weight Bearing)            On Anticoagulation? YES  Lovenox                                     Lab Results   Component Value Date/Time    Glucose 105 (H) 02/05/2021 06:35 AM    INR 1.0 02/02/2021 03:38 PM    HGB 11.5 (L) 02/05/2021 06:35 AM    HGB 13.0 02/04/2021 05:53 AM    HGB 13.8 02/03/2021 02:13 AM    HGB 14.0 02/02/2021 03:38 PM       Readmission Risks:  Score:         RECOMMENDATION     See After Visit Summary (AVS) for:  · Discharge instructions  · After [address]   · Medication Reconciliation          [facility] Orthopaedic Nurse Navigator  JOE Perez, RN-BC       Office  [phone]  Cell      [phone]  Fax      [phone]  [email or web address]             . Dianne

## 2021-02-08 NOTE — PROGRESS NOTES
Transition of care outreach postponed for 14 days due to patient's discharge to SNF.   [facility] 2/2-2/5/21 Hip Fx d/c Mely Leonard f/u 14d

## 2021-02-11 ENCOUNTER — PATIENT OUTREACH (OUTPATIENT)
Dept: CASE MANAGEMENT | Age: 86
End: 2021-02-11

## 2021-02-15 NOTE — PROGRESS NOTES
Post Acute Facility Update     *The information contained in this note was received during a weekly care coordination call with the post acute facility*    Current Facility: [address] [place] [address] ([facility])  Anticipated Discharge Date: TBD    Facility Nursing Update: no current update  Facility Social Work Update: no current update  ADL's: Dependent  Current Level of Assistance: Dependent  Bed Mobility: Maximum Assistance  Transfers: Dependent  Ambulation: Dependent  How far (in feet) is the patient ambulating?  0  Does patient use an assistive device: no  If yes, device used: N/A  Barriers to Discharge: TBD  Other: patient is performing sliding board transfers at this time with near total dependence

## 2021-02-18 ENCOUNTER — PATIENT OUTREACH (OUTPATIENT)
Dept: CASE MANAGEMENT | Age: 86
End: 2021-02-18

## 2021-02-18 NOTE — PROGRESS NOTES
[address] [place] [address] Dr Discharge Note    *The information contained in this note was received during a weekly care coordination call with the post acute facility*      Patient was discharged from [facility] ([facility]) on 2/17/2021. Patient will be admitted to [address] at [facility]. .    PCP: UNKNOWN  KATHY appointment: No future appointments. LPN Care Coordinator managing patient: [facility] Team will sign off at this time. Medications were not reconciled and general patient assessment was not completed during this skilled nursing facility outreach.

## 2021-02-23 ENCOUNTER — PATIENT OUTREACH (OUTPATIENT)
Dept: CASE MANAGEMENT | Age: 86
End: 2021-02-23

## 2022-03-19 PROBLEM — S72.009A HIP FRACTURE (HCC): Status: ACTIVE | Noted: 2021-02-02

## 2023-05-21 RX ORDER — POLYETHYLENE GLYCOL 3350 17 G/17G
17 POWDER, FOR SOLUTION ORAL DAILY PRN
COMMUNITY
Start: 2021-02-05

## 2023-05-21 RX ORDER — ATORVASTATIN CALCIUM 20 MG/1
20 TABLET, FILM COATED ORAL DAILY
COMMUNITY

## 2023-05-21 RX ORDER — AMLODIPINE BESYLATE 2.5 MG/1
2.5 TABLET ORAL DAILY
COMMUNITY
Start: 2021-02-06

## 2023-05-21 RX ORDER — ACETAMINOPHEN 325 MG/1
650 TABLET ORAL EVERY 4 HOURS PRN
COMMUNITY
Start: 2021-02-05

## 2023-05-21 RX ORDER — AMOXICILLIN 250 MG
1 CAPSULE ORAL 2 TIMES DAILY
COMMUNITY
Start: 2021-02-05

## 2023-05-21 RX ORDER — B-COMPLEX WITH VITAMIN C
1 TABLET ORAL
COMMUNITY
Start: 2021-02-05

## (undated) DEVICE — 3M(TM) MEDIPORE(TM) H SOFT CLOTH TAPE 2866: Brand: 3M™ MEDIPORE™

## (undated) DEVICE — 3M™ IOBAN™ 2 ANTIMICROBIAL INCISE DRAPE 6651EZ: Brand: IOBAN™ 2

## (undated) DEVICE — HANDLE LT SNAP ON ULT DURABLE LENS FOR TRUMPF ALC DISPOSABLE

## (undated) DEVICE — SUTURE ETHBND EXCEL SZ 5 L30IN NONABSORBABLE GRN L40MM V-37 MB66G

## (undated) DEVICE — PAD BD MATTRESS 73X32 IN STD CONVOLUTED FOAM LTX FREE

## (undated) DEVICE — CEMENT MIXING SYSTEM WITH FEMORAL BREAKWAY NOZZLE: Brand: REVOLUTION

## (undated) DEVICE — REM POLYHESIVE ADULT PATIENT RETURN ELECTRODE: Brand: VALLEYLAB

## (undated) DEVICE — SOL IRR SOD CL 0.9% 1000ML BTL --

## (undated) DEVICE — 2108 SERIES SAGITTAL BLADE (24.8 X 0.88 X 73.8MM)

## (undated) DEVICE — SUTURE VCRL SZ 1 L36IN ABSRB VLT L36MM CT-1 1/2 CIR J347H

## (undated) DEVICE — 4-PORT MANIFOLD: Brand: NEPTUNE 2

## (undated) DEVICE — PREP SKN CHLRAPRP APL 26ML STR --

## (undated) DEVICE — HANDPIECE SET WITH COAXIAL HIGH FLOW TIP AND SUCTION TUBE: Brand: INTERPULSE

## (undated) DEVICE — DRESSING PETRO W3XL8IN N ADH OIL EMUL GZ CURAD

## (undated) DEVICE — GARMENT,MEDLINE,DVT,INT,CALF,MED, GEN2: Brand: MEDLINE

## (undated) DEVICE — INFECTION CONTROL KIT SYS

## (undated) DEVICE — STERILE POLYISOPRENE POWDER-FREE SURGICAL GLOVES WITH EMOLLIENT COATING: Brand: PROTEXIS

## (undated) DEVICE — STERILE POLYISOPRENE POWDER-FREE SURGICAL GLOVES: Brand: PROTEXIS

## (undated) DEVICE — SUTURE STRATAFIX SYMMETRIC SZ 1 L18IN ABSRB VLT CT1 L36CM SXPP1A404

## (undated) DEVICE — SUTURE VCRL SZ 2-0 L36IN ABSRB VLT L36MM CT-1 1/2 CIR J345H

## (undated) DEVICE — SCRUB DRY SURG EZ SCRUB BRUSH PREOPERATIVE GRN

## (undated) DEVICE — NEEDLE HYPO 18GA L1.5IN PNK S STL HUB POLYPR SHLD REG BVL

## (undated) DEVICE — STRAP,POSITIONING,KNEE/BODY,FOAM,4X60": Brand: MEDLINE

## (undated) DEVICE — SUTURE STRATAFIX SYMMETRIC PDS + SZ 1 L18IN ABSRB VLT L48MM SXPP1A400

## (undated) DEVICE — KIT BONE CEM PREP FEM CEMEX

## (undated) DEVICE — Device

## (undated) DEVICE — PILLOW POS W15XH6XL22IN RASPBERRY FOAM ABD W/ STRP DISP FOR

## (undated) DEVICE — SOL IRR SOD CL 0.9% 3000ML --

## (undated) DEVICE — CONNECTING ROD COMPACT: Brand: HOFFMANN

## (undated) DEVICE — TOTAL TRAY, DB, 100% SILI FOLEY, 16FR 10: Brand: MEDLINE

## (undated) DEVICE — SOL IRR STRL H2O 1000ML BTL --